# Patient Record
Sex: MALE | Race: ASIAN | NOT HISPANIC OR LATINO | Employment: UNEMPLOYED | URBAN - METROPOLITAN AREA
[De-identification: names, ages, dates, MRNs, and addresses within clinical notes are randomized per-mention and may not be internally consistent; named-entity substitution may affect disease eponyms.]

---

## 2021-01-05 ENCOUNTER — LAB VISIT (OUTPATIENT)
Dept: PRIMARY CARE CLINIC | Facility: OTHER | Age: 56
End: 2021-01-05
Attending: INTERNAL MEDICINE
Payer: OTHER GOVERNMENT

## 2021-01-05 DIAGNOSIS — Z03.818 ENCOUNTER FOR OBSERVATION FOR SUSPECTED EXPOSURE TO OTHER BIOLOGICAL AGENTS RULED OUT: ICD-10-CM

## 2021-01-05 PROCEDURE — U0003 INFECTIOUS AGENT DETECTION BY NUCLEIC ACID (DNA OR RNA); SEVERE ACUTE RESPIRATORY SYNDROME CORONAVIRUS 2 (SARS-COV-2) (CORONAVIRUS DISEASE [COVID-19]), AMPLIFIED PROBE TECHNIQUE, MAKING USE OF HIGH THROUGHPUT TECHNOLOGIES AS DESCRIBED BY CMS-2020-01-R: HCPCS

## 2021-01-06 LAB — SARS-COV-2 RNA RESP QL NAA+PROBE: NOT DETECTED

## 2023-10-21 LAB
CHOL/HDLC RATIO: 2.9
CHOLEST SERPL-MCNC: 155 MG/DL
CHOLEST SERPL-MCNC: 205 MG/DL
HBA1C MFR BLD HPLC: 6.9 %
HDLC SERPL-MCNC: 70 MG/DL
LDLC SERPL CALC-MCNC: 108 MG/DL
NON HDL CHOL (CALC): 135

## 2024-04-22 ENCOUNTER — TELEPHONE (OUTPATIENT)
Dept: PRIMARY CARE CLINIC | Facility: CLINIC | Age: 59
End: 2024-04-22
Payer: COMMERCIAL

## 2024-04-22 NOTE — TELEPHONE ENCOUNTER
----- Message from Melissa Ramsey sent at 4/22/2024 12:38 PM CDT -----  Contact: [phone] Pts spouse  Pts spouse is calling in regards to asking if Dr Hannah can accept the pt as a new pt because the pt speaks Yakut. Please call and advise. Thank you.

## 2024-05-07 ENCOUNTER — OFFICE VISIT (OUTPATIENT)
Dept: PRIMARY CARE CLINIC | Facility: CLINIC | Age: 59
End: 2024-05-07
Payer: COMMERCIAL

## 2024-05-07 VITALS
HEART RATE: 73 BPM | BODY MASS INDEX: 23.32 KG/M2 | RESPIRATION RATE: 18 BRPM | WEIGHT: 148.56 LBS | SYSTOLIC BLOOD PRESSURE: 148 MMHG | HEIGHT: 67 IN | DIASTOLIC BLOOD PRESSURE: 96 MMHG | OXYGEN SATURATION: 98 %

## 2024-05-07 DIAGNOSIS — Z12.11 ENCOUNTER FOR SCREENING COLONOSCOPY: ICD-10-CM

## 2024-05-07 DIAGNOSIS — Z72.0 TOBACCO USE: ICD-10-CM

## 2024-05-07 DIAGNOSIS — E78.5 HYPERLIPIDEMIA, UNSPECIFIED HYPERLIPIDEMIA TYPE: ICD-10-CM

## 2024-05-07 DIAGNOSIS — I10 PRIMARY HYPERTENSION: Primary | ICD-10-CM

## 2024-05-07 DIAGNOSIS — E11.9 TYPE 2 DIABETES MELLITUS WITHOUT COMPLICATION, WITHOUT LONG-TERM CURRENT USE OF INSULIN: ICD-10-CM

## 2024-05-07 PROCEDURE — 99999 PR PBB SHADOW E&M-EST. PATIENT-LVL V: CPT | Mod: PBBFAC,,, | Performed by: INTERNAL MEDICINE

## 2024-05-07 PROCEDURE — 3077F SYST BP >= 140 MM HG: CPT | Mod: CPTII,S$GLB,, | Performed by: INTERNAL MEDICINE

## 2024-05-07 PROCEDURE — 3080F DIAST BP >= 90 MM HG: CPT | Mod: CPTII,S$GLB,, | Performed by: INTERNAL MEDICINE

## 2024-05-07 PROCEDURE — 4010F ACE/ARB THERAPY RXD/TAKEN: CPT | Mod: CPTII,S$GLB,, | Performed by: INTERNAL MEDICINE

## 2024-05-07 PROCEDURE — 99214 OFFICE O/P EST MOD 30 MIN: CPT | Mod: S$GLB,,, | Performed by: INTERNAL MEDICINE

## 2024-05-07 PROCEDURE — 3008F BODY MASS INDEX DOCD: CPT | Mod: CPTII,S$GLB,, | Performed by: INTERNAL MEDICINE

## 2024-05-07 PROCEDURE — 1159F MED LIST DOCD IN RCRD: CPT | Mod: CPTII,S$GLB,, | Performed by: INTERNAL MEDICINE

## 2024-05-07 PROCEDURE — 1160F RVW MEDS BY RX/DR IN RCRD: CPT | Mod: CPTII,S$GLB,, | Performed by: INTERNAL MEDICINE

## 2024-05-07 RX ORDER — LISINOPRIL AND HYDROCHLOROTHIAZIDE 12.5; 2 MG/1; MG/1
1 TABLET ORAL 2 TIMES DAILY
COMMUNITY
Start: 2024-03-11 | End: 2024-05-07

## 2024-05-07 RX ORDER — AMLODIPINE AND BENAZEPRIL HYDROCHLORIDE 5; 40 MG/1; MG/1
1 CAPSULE ORAL DAILY
Qty: 90 CAPSULE | Refills: 3 | Status: SHIPPED | OUTPATIENT
Start: 2024-05-07 | End: 2025-05-07

## 2024-05-07 RX ORDER — ATORVASTATIN CALCIUM 10 MG/1
10 TABLET, FILM COATED ORAL NIGHTLY
Qty: 90 TABLET | Refills: 3 | Status: SHIPPED | OUTPATIENT
Start: 2024-05-07

## 2024-05-07 RX ORDER — METFORMIN HYDROCHLORIDE 500 MG/1
500 TABLET, EXTENDED RELEASE ORAL 2 TIMES DAILY
COMMUNITY
Start: 2024-03-11 | End: 2024-05-07 | Stop reason: SDUPTHER

## 2024-05-07 RX ORDER — ATORVASTATIN CALCIUM 10 MG/1
10 TABLET, FILM COATED ORAL NIGHTLY
COMMUNITY
End: 2024-05-07 | Stop reason: SDUPTHER

## 2024-05-07 RX ORDER — LISINOPRIL AND HYDROCHLOROTHIAZIDE 12.5; 2 MG/1; MG/1
1 TABLET ORAL 2 TIMES DAILY
Status: CANCELLED | OUTPATIENT
Start: 2024-05-07

## 2024-05-07 RX ORDER — METFORMIN HYDROCHLORIDE 500 MG/1
500 TABLET, EXTENDED RELEASE ORAL 2 TIMES DAILY
Qty: 180 TABLET | Refills: 3 | Status: SHIPPED | OUTPATIENT
Start: 2024-05-07

## 2024-05-09 NOTE — PROGRESS NOTES
Subjective:       Patient ID: Ck Chavez is a 59 y.o. male.    Chief Complaint: Establish Care    HPI  patient with history of mellitus hypertension hyperlipidemia tobacco use patient visit today to establish care he ran medication yesterday he deny any physical no short of breath chest pain dyspnea with exertion no headache no abdominal pain no nausea vomiting or diarrhea no nocturia no weight gain weight loss no change in bowel habit or urination patient still smoking and at the present time does not want to do any health maintenance or quit smoking .  Discussed with patient currently not much interested in do anything to reverse he cardiovascular risk  Review of Systems   Constitutional:  Positive for fatigue and unexpected weight change.   Respiratory:  Positive for cough. Negative for shortness of breath.    Cardiovascular:  Negative for chest pain and palpitations.   Gastrointestinal:  Negative for abdominal pain.   Musculoskeletal:  Negative for arthralgias and back pain.   Psychiatric/Behavioral:  Negative for dysphoric mood.        Objective:      Physical Exam  Vitals and nursing note reviewed.   Constitutional:       General: He is not in acute distress.     Appearance: He is well-developed.   HENT:      Head: Normocephalic and atraumatic.      Right Ear: External ear normal.      Left Ear: External ear normal.      Nose: Nose normal.   Eyes:      Extraocular Movements: Extraocular movements intact.      Conjunctiva/sclera: Conjunctivae normal.      Pupils: Pupils are equal, round, and reactive to light.   Neck:      Thyroid: No thyromegaly.   Cardiovascular:      Rate and Rhythm: Normal rate and regular rhythm.      Heart sounds: Normal heart sounds. No murmur heard.     No friction rub. No gallop.   Pulmonary:      Effort: Pulmonary effort is normal. No respiratory distress.      Breath sounds: Normal breath sounds. No wheezing or rales.   Chest:      Chest wall: No tenderness.   Abdominal:       General: Bowel sounds are normal. There is no distension.      Palpations: Abdomen is soft.      Tenderness: There is no abdominal tenderness.   Musculoskeletal:         General: No tenderness or deformity. Normal range of motion.      Cervical back: Normal range of motion and neck supple.   Lymphadenopathy:      Cervical: No cervical adenopathy.   Skin:     General: Skin is warm and dry.      Findings: No erythema or rash.   Neurological:      Mental Status: He is alert and oriented to person, place, and time.   Psychiatric:         Mood and Affect: Mood normal.         Thought Content: Thought content normal.         Judgment: Judgment normal.         Assessment:       1. Primary hypertension    2. Type 2 diabetes mellitus without complication, without long-term current use of insulin    3. Hyperlipidemia, unspecified hyperlipidemia type    4. Tobacco use    5. Encounter for screening colonoscopy        Plan:       Primary hypertension  Comments:  Well control with diet medication  Orders:  -     amLODIPine-benazepriL (LOTREL) 5-40 mg per capsule; Take 1 capsule by mouth once daily.  Dispense: 90 capsule; Refill: 3  -     CBC Auto Differential; Future; Expected date: 05/08/2024  -     Comprehensive Metabolic Panel; Future; Expected date: 05/08/2024  -     Urinalysis; Future; Expected date: 05/08/2024  -     HIV 1/2 Ag/Ab (4th Gen); Future; Expected date: 05/08/2024  -     Hepatitis C Antibody; Future; Expected date: 05/08/2024  -     Case Request Endoscopy: COLONOSCOPY    Type 2 diabetes mellitus without complication, without long-term current use of insulin  -     metFORMIN (GLUCOPHAGE-XR) 500 MG ER 24hr tablet; Take 1 tablet (500 mg total) by mouth 2 (two) times daily.  Dispense: 180 tablet; Refill: 3  -     Hemoglobin A1C; Future; Expected date: 05/08/2024  -     Microalbumin/Creatinine Ratio, Urine; Future; Expected date: 05/08/2024  -     PSA, Screening; Future; Expected date: 05/08/2024    Hyperlipidemia,  unspecified hyperlipidemia type  -     atorvastatin (LIPITOR) 10 MG tablet; Take 1 tablet (10 mg total) by mouth every evening.  Dispense: 90 tablet; Refill: 3  -     Lipid Panel; Future; Expected date: 05/08/2024    Tobacco use  Comments:  Patient not ready to stop smoking yet    Encounter for screening colonoscopy  -     Case Request Endoscopy: COLONOSCOPY        Medication List with Changes/Refills   New Medications    AMLODIPINE-BENAZEPRIL (LOTREL) 5-40 MG PER CAPSULE    Take 1 capsule by mouth once daily.   Changed and/or Refilled Medications    Modified Medication Previous Medication    ATORVASTATIN (LIPITOR) 10 MG TABLET atorvastatin (LIPITOR) 10 MG tablet       Take 1 tablet (10 mg total) by mouth every evening.    Take 10 mg by mouth every evening.    METFORMIN (GLUCOPHAGE-XR) 500 MG ER 24HR TABLET metFORMIN (GLUCOPHAGE-XR) 500 MG ER 24hr tablet       Take 1 tablet (500 mg total) by mouth 2 (two) times daily.    Take 500 mg by mouth 2 (two) times daily.   Discontinued Medications    LISINOPRIL 10 MG TABLET    Take 2 tablets (20 mg total) by mouth once daily.    LISINOPRIL-HYDROCHLOROTHIAZIDE (PRINZIDE,ZESTORETIC) 20-12.5 MG PER TABLET    Take 1 tablet by mouth 2 (two) times daily.    NAPROXEN (NAPROSYN) 500 MG TABLET    Take 1 tablet (500 mg total) by mouth 2 (two) times daily with meals.

## 2024-05-22 ENCOUNTER — CLINICAL SUPPORT (OUTPATIENT)
Dept: PRIMARY CARE CLINIC | Facility: CLINIC | Age: 59
End: 2024-05-22
Payer: COMMERCIAL

## 2024-05-22 VITALS — DIASTOLIC BLOOD PRESSURE: 84 MMHG | SYSTOLIC BLOOD PRESSURE: 122 MMHG

## 2024-05-22 DIAGNOSIS — I10 PRIMARY HYPERTENSION: Primary | ICD-10-CM

## 2024-05-22 PROCEDURE — 99999 PR PBB SHADOW E&M-EST. PATIENT-LVL II: CPT | Mod: PBBFAC,,,

## 2024-05-22 NOTE — LETTER
May 22, 2024      [facility] [address]  [place] [address]  Phone: [phone]  Fax: [phone]       Patient: Ck Chavez   YOB: 1965  Date of Visit: 05/22/2024    To Whom It May Concern:    Kevin Chavez  was at [facility] on 05/22/2024. The patient may return to work/school on 5/23/2024 with restrictions, no heavy lifting with right arm for 1 week. If you have any questions or concerns, or if I can be of further assistance, please do not hesitate to contact me.    Sincerely,        Tino Hannah MD

## 2024-05-22 NOTE — PROGRESS NOTES
2 week follow up BP check for Dr. Hannah, /84.  Patient is currently taking amlodipine-benazepril 5-40 mg per cap po once daily.  Patient denies N/V, headaches, dizziness, blurred vision.

## 2024-05-27 ENCOUNTER — TELEPHONE (OUTPATIENT)
Dept: GASTROENTEROLOGY | Facility: CLINIC | Age: 59
End: 2024-05-27
Payer: COMMERCIAL

## 2024-06-06 ENCOUNTER — OFFICE VISIT (OUTPATIENT)
Dept: PRIMARY CARE CLINIC | Facility: CLINIC | Age: 59
End: 2024-06-06
Payer: COMMERCIAL

## 2024-06-06 ENCOUNTER — PATIENT OUTREACH (OUTPATIENT)
Dept: ADMINISTRATIVE | Facility: HOSPITAL | Age: 59
End: 2024-06-06
Payer: COMMERCIAL

## 2024-06-06 VITALS
HEART RATE: 90 BPM | SYSTOLIC BLOOD PRESSURE: 138 MMHG | DIASTOLIC BLOOD PRESSURE: 66 MMHG | RESPIRATION RATE: 18 BRPM | HEIGHT: 67 IN | WEIGHT: 145.75 LBS | BODY MASS INDEX: 22.88 KG/M2 | OXYGEN SATURATION: 98 %

## 2024-06-06 DIAGNOSIS — S80.12XA CONTUSION OF LEFT LOWER EXTREMITY, INITIAL ENCOUNTER: Primary | ICD-10-CM

## 2024-06-06 DIAGNOSIS — S33.5XXA LUMBAR SPRAIN, INITIAL ENCOUNTER: ICD-10-CM

## 2024-06-06 DIAGNOSIS — I10 PRIMARY HYPERTENSION: ICD-10-CM

## 2024-06-06 DIAGNOSIS — S13.9XXA NECK SPRAIN, INITIAL ENCOUNTER: ICD-10-CM

## 2024-06-06 DIAGNOSIS — E11.9 TYPE 2 DIABETES MELLITUS WITHOUT COMPLICATION, WITHOUT LONG-TERM CURRENT USE OF INSULIN: ICD-10-CM

## 2024-06-06 DIAGNOSIS — M51.16 LUMBAR DISC DISEASE WITH RADICULOPATHY: ICD-10-CM

## 2024-06-06 PROCEDURE — 1160F RVW MEDS BY RX/DR IN RCRD: CPT | Mod: CPTII,S$GLB,, | Performed by: INTERNAL MEDICINE

## 2024-06-06 PROCEDURE — 4010F ACE/ARB THERAPY RXD/TAKEN: CPT | Mod: CPTII,S$GLB,, | Performed by: INTERNAL MEDICINE

## 2024-06-06 PROCEDURE — 3008F BODY MASS INDEX DOCD: CPT | Mod: CPTII,S$GLB,, | Performed by: INTERNAL MEDICINE

## 2024-06-06 PROCEDURE — 3075F SYST BP GE 130 - 139MM HG: CPT | Mod: CPTII,S$GLB,, | Performed by: INTERNAL MEDICINE

## 2024-06-06 PROCEDURE — 99213 OFFICE O/P EST LOW 20 MIN: CPT | Mod: S$GLB,,, | Performed by: INTERNAL MEDICINE

## 2024-06-06 PROCEDURE — 99999 PR PBB SHADOW E&M-EST. PATIENT-LVL IV: CPT | Mod: PBBFAC,,, | Performed by: INTERNAL MEDICINE

## 2024-06-06 PROCEDURE — 1159F MED LIST DOCD IN RCRD: CPT | Mod: CPTII,S$GLB,, | Performed by: INTERNAL MEDICINE

## 2024-06-06 PROCEDURE — 3078F DIAST BP <80 MM HG: CPT | Mod: CPTII,S$GLB,, | Performed by: INTERNAL MEDICINE

## 2024-06-06 NOTE — PROGRESS NOTES
Health Maintenance Due   Topic Date Due    Hepatitis C Screening  Never done    Lipid Panel  Never done    Colorectal Cancer Screening  Never done    RSV Vaccine (Age 60+ and Pregnant patients) (1 - 1-dose 60+ series) Never done        Chart review done.   HM updated.   Immunizations reviewed & updated.   Care Everywhere updated.

## 2024-06-06 NOTE — PROGRESS NOTES
Subjective:       Patient ID: Ck Chavez is a 59 y.o. male.    Chief Complaint: Back Pain, Neck Pain, and Leg Pain    HPI  Pt with h/o HTN tobacco use 1 ppd  pt visit today c/o leg hurt on Tuesday at work get ht by dumpster swollen bluish  bruise too painful had to go home use cold compress at home getting better swelling bruise resolving pt also c/o lower back pain and neck pain and can't extend rt big toe his lower back pain has been chronic but neck pain is new no radiculopathy or weakness in arms or leg  Review of Systems   Constitutional:  Negative for unexpected weight change.   Respiratory:  Negative for shortness of breath.    Gastrointestinal:  Negative for abdominal pain.   Musculoskeletal:  Positive for arthralgias, back pain, neck pain and neck stiffness.   Psychiatric/Behavioral:  The patient is nervous/anxious.       Objective:      Physical Exam  Vitals and nursing note reviewed.   Constitutional:       General: He is not in acute distress.     Appearance: He is well-developed.   HENT:      Head: Normocephalic and atraumatic.      Right Ear: External ear normal.      Left Ear: External ear normal.      Nose: Nose normal.      Mouth/Throat:      Pharynx: No oropharyngeal exudate.   Eyes:      Conjunctiva/sclera: Conjunctivae normal.      Pupils: Pupils are equal, round, and reactive to light.   Neck:      Thyroid: No thyromegaly.   Cardiovascular:      Rate and Rhythm: Normal rate and regular rhythm.      Heart sounds: Normal heart sounds. No murmur heard.     No friction rub. No gallop.   Pulmonary:      Effort: Pulmonary effort is normal. No respiratory distress.      Breath sounds: Normal breath sounds. No wheezing.   Abdominal:      General: Bowel sounds are normal. There is no distension.      Palpations: Abdomen is soft.      Tenderness: There is no abdominal tenderness.   Musculoskeletal:         General: Tenderness present. No deformity. Normal range of motion.      Cervical back: Normal range  of motion and neck supple.   Lymphadenopathy:      Cervical: No cervical adenopathy.   Skin:     General: Skin is warm and dry.      Findings: No erythema or rash.      Comments: Moderate size bruise left lateral leg tender with palpation no swelling no redness   Neurological:      Mental Status: He is alert and oriented to person, place, and time.   Psychiatric:         Thought Content: Thought content normal.         Judgment: Judgment normal.         Assessment:       1. Contusion of left lower extremity, initial encounter    2. Primary hypertension    3. Type 2 diabetes mellitus without complication, without long-term current use of insulin    4. Neck sprain, initial encounter    5. Lumbar sprain, initial encounter    6. Lumbar disc disease with radiculopathy        Plan:       Contusion of left lower extremity, initial encounter  -     X-Ray Knee 3 View Left; Future; Expected date: 06/06/2024    Primary hypertension  Comments:  fairly controlled continue with tx and monitor    Type 2 diabetes mellitus without complication, without long-term current use of insulin  Comments:  labs still pending from [facility] month remind pt to do asap    Neck sprain, initial encounter  -     X-Ray Cervical Spine AP And Lateral; Future; Expected date: 06/06/2024    Lumbar sprain, initial encounter  -     X-Ray Lumbar Spine Ap And Lateral; Future; Expected date: 06/06/2024    Lumbar disc disease with radiculopathy  -     X-Ray Knee 3 View Left; Future; Expected date: 06/06/2024        Medication List with Changes/Refills   Current Medications    AMLODIPINE-BENAZEPRIL (LOTREL) 5-40 MG PER CAPSULE    Take 1 capsule by mouth once daily.    ATORVASTATIN (LIPITOR) 10 MG TABLET    Take 1 tablet (10 mg total) by mouth every evening.    METFORMIN (GLUCOPHAGE-XR) 500 MG ER 24HR TABLET    Take 1 tablet (500 mg total) by mouth 2 (two) times daily.

## 2024-06-06 NOTE — LETTER
June 6, 2024      [facility] [address]  [address]  [place] [address]  Phone: [phone]  Fax: [phone]       Patient: Ck Chavez   YOB: 1965  Date of Visit: 06/06/2024    To Whom It May Concern:    Kevin Chavez  was at [facility] on 06/06/2024. The patient may return to work on 06/12/2024 with no restrictions. If you have any questions or concerns, or if I can be of further assistance, please do not hesitate to contact me.    Sincerely,    Tino Hannah MD

## 2024-06-07 ENCOUNTER — TELEPHONE (OUTPATIENT)
Dept: GASTROENTEROLOGY | Facility: CLINIC | Age: 59
End: 2024-06-07
Payer: COMMERCIAL

## 2024-06-10 ENCOUNTER — TELEPHONE (OUTPATIENT)
Dept: SURGERY | Facility: CLINIC | Age: 59
End: 2024-06-10
Payer: COMMERCIAL

## 2024-06-10 NOTE — TELEPHONE ENCOUNTER
The patient has been advised the Colonoscopy Prep Kit will be ordered from the patient's verified preferred pharmacy on file. The medication can  be picked up by the patient, or the patient's designated representative.The patient was further explained the Colonoscopy Prep instructions will be mailed to the patient verified mailing address on file, or put onto the [employer] portal, whichever method of delivery the patient prefers.Additionally this patient was informed,not to follow the instructions that comes with the bowel prep medication. However, the patient was instructed to please follow the Colonoscopy Bowel Prep instructions that's being provided by the . The patient was asked to please to follow the Colonoscopy Prep instructions being provided as precisely,and  meticulously as possible.The patient was advised you  will receive a follow up phone call to summarize the Colonoscopy Prep instructions prior to the scheduled Colonoscopy procedure date. At this time the patient will be given an opportunity to ask any questions regarding the Colonoscopy procedure, and it's associated Bowel Prep instructions.

## 2024-06-10 NOTE — TELEPHONE ENCOUNTER
Called patient in reference to a referral to Colorectal Surgery for colon cancer screening. Patient verbally consented to a Colonoscopy and requested to be scheduled for a Colonoscopy on 07/10/2024 Patient was advised a designated  is required on the day of the Colonoscopy to drive the patient home and the  must be at least. 18 years old.Colonoscopy Prep instructions were thoroughly explained and discussed with the patient.It was emphasized, and reiterated to the patient, to please not to follow the bowel prep instructions that comes with the bowel prep package.However, to please follow the prep instructions that will be received in the mail,or via the [employer] portal, or by both modes of delivery, which ever method of delivery the patient prefers,from the [facility]   Patient acknowledges understanding Prep instructions as explained and discussed on the phone.. Patient was advised the Colonoscopy Prep instructions discussed and explained on the phone,are being mailed out to the patient's verified address on file,or put onto the [employer] portal,or both methods of delivery, whichever the patient prefers. Patient's address on file was verified with the patient for accuracy of mailing. Patient's medications on file was reviewed with the patient for accuracy of information. Patient denies taking  any other medications other than those listed and verified on medication profile.Patient was explained the Colonoscopy will be performed here at [facility]. Patient was further explained the Pre-Op will call one day prior to the procedure date, to discuss Pre-Op instructions;and what time to report for the Colonoscopy. The patient was given the opportunity to ask any questions about the Colonoscopy. No further issues were discussed.

## 2024-06-11 RX ORDER — SODIUM, POTASSIUM,MAG SULFATES 17.5-3.13G
1 SOLUTION, RECONSTITUTED, ORAL ORAL DAILY
Qty: 1 KIT | Refills: 0 | Status: SHIPPED | OUTPATIENT
Start: 2024-06-11 | End: 2024-06-13

## 2024-07-08 ENCOUNTER — PATIENT OUTREACH (OUTPATIENT)
Dept: ADMINISTRATIVE | Facility: HOSPITAL | Age: 59
End: 2024-07-08
Payer: COMMERCIAL

## 2024-07-08 NOTE — PROGRESS NOTES
Health Maintenance Due   Topic Date Due    Hepatitis C Screening  Never done    Hemoglobin A1c  Never done    Diabetes Urine Screening  Never done    Foot Exam  Never done    RSV Vaccine (Age 60+ and Pregnant patients) (1 - 1-dose 60+ series) Never done        Chart review done.   HM updated.   Immunizations reviewed & updated.   Care Everywhere updated.

## 2024-07-09 ENCOUNTER — OFFICE VISIT (OUTPATIENT)
Dept: PRIMARY CARE CLINIC | Facility: CLINIC | Age: 59
End: 2024-07-09
Payer: COMMERCIAL

## 2024-07-09 VITALS
WEIGHT: 143.88 LBS | BODY MASS INDEX: 22.58 KG/M2 | RESPIRATION RATE: 18 BRPM | HEART RATE: 83 BPM | DIASTOLIC BLOOD PRESSURE: 82 MMHG | HEIGHT: 67 IN | SYSTOLIC BLOOD PRESSURE: 136 MMHG | OXYGEN SATURATION: 97 %

## 2024-07-09 DIAGNOSIS — M51.16 LUMBAR DISC DISEASE WITH RADICULOPATHY: Primary | ICD-10-CM

## 2024-07-09 DIAGNOSIS — S80.12XA CONTUSION OF LEFT LOWER EXTREMITY, INITIAL ENCOUNTER: ICD-10-CM

## 2024-07-09 DIAGNOSIS — E78.5 HYPERLIPIDEMIA, UNSPECIFIED HYPERLIPIDEMIA TYPE: ICD-10-CM

## 2024-07-09 DIAGNOSIS — Z72.0 TOBACCO USE: ICD-10-CM

## 2024-07-09 DIAGNOSIS — I10 PRIMARY HYPERTENSION: ICD-10-CM

## 2024-07-09 DIAGNOSIS — E11.9 TYPE 2 DIABETES MELLITUS WITHOUT COMPLICATION, WITHOUT LONG-TERM CURRENT USE OF INSULIN: ICD-10-CM

## 2024-07-09 PROCEDURE — 3008F BODY MASS INDEX DOCD: CPT | Mod: CPTII,S$GLB,, | Performed by: INTERNAL MEDICINE

## 2024-07-09 PROCEDURE — 3075F SYST BP GE 130 - 139MM HG: CPT | Mod: CPTII,S$GLB,, | Performed by: INTERNAL MEDICINE

## 2024-07-09 PROCEDURE — 99999 PR PBB SHADOW E&M-EST. PATIENT-LVL III: CPT | Mod: PBBFAC,,, | Performed by: INTERNAL MEDICINE

## 2024-07-09 PROCEDURE — 99214 OFFICE O/P EST MOD 30 MIN: CPT | Mod: S$GLB,,, | Performed by: INTERNAL MEDICINE

## 2024-07-09 PROCEDURE — 3079F DIAST BP 80-89 MM HG: CPT | Mod: CPTII,S$GLB,, | Performed by: INTERNAL MEDICINE

## 2024-07-09 PROCEDURE — 1160F RVW MEDS BY RX/DR IN RCRD: CPT | Mod: CPTII,S$GLB,, | Performed by: INTERNAL MEDICINE

## 2024-07-09 PROCEDURE — 4010F ACE/ARB THERAPY RXD/TAKEN: CPT | Mod: CPTII,S$GLB,, | Performed by: INTERNAL MEDICINE

## 2024-07-09 PROCEDURE — 1159F MED LIST DOCD IN RCRD: CPT | Mod: CPTII,S$GLB,, | Performed by: INTERNAL MEDICINE

## 2024-07-09 NOTE — LETTER
July 9, 2024      [facility] [address]  [address]  [place] [address]  Phone: [phone]  Fax: [phone]       Patient: Ck Chavez   YOB: 1965  Date of Visit: 07/09/2024    To Whom It May Concern:    Kevin Chavez  was at [facility] on 07/09/2024. He may return to work on 07/15/2024 with restrictions. No climbing. No standing for long periods of time. No lifting more than 15lbs. If you have any questions or concerns, or if I can be of further assistance, please do not hesitate to contact me.    Sincerely,    Gretchen Campuzano MA

## 2024-07-10 ENCOUNTER — PATIENT MESSAGE (OUTPATIENT)
Dept: ADMINISTRATIVE | Facility: HOSPITAL | Age: 59
End: 2024-07-10
Payer: COMMERCIAL

## 2024-07-10 ENCOUNTER — TELEPHONE (OUTPATIENT)
Dept: PRIMARY CARE CLINIC | Facility: CLINIC | Age: 59
End: 2024-07-10
Payer: COMMERCIAL

## 2024-07-10 NOTE — PROGRESS NOTES
Subjective:       Patient ID: Ck Chavez is a 59 y.o. male.    Chief Complaint: Follow-up (4 week)    Follow-up  Associated symptoms include arthralgias. Pertinent negatives include no abdominal pain or chest pain.     Patient visit today for follow-up he is ready to go back to work tomorrow he had chronic back pain with right sciatica and muscle weakness from old injury and it is getting worse pain is from the right big toe extend into the right leg right lateral right thigh and right lower back he had MRI done sick years ago that show severe foramen stenosis from lumbar spine disc herniation on the right 3 disc patient has been treated at [facility] and physical therapy he had recent MRI of lumbar spine again in all in niece but results not available patient will have hospital to send a copy of the MRI his still smoking a pack a day and not ready to quit yet refused any assistance patient had not seen by Neurosurgery yet he has not had any epidural injection recently either the lower back pain and the radiculopathy pain interfere with 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   Respiratory:  Negative for shortness of breath.    Cardiovascular:  Negative for chest pain.   Gastrointestinal:  Negative for abdominal pain.   Musculoskeletal:  Positive for arthralgias and back pain.   Psychiatric/Behavioral:  Negative for dysphoric mood.        Objective:      Physical Exam  Vitals and nursing note reviewed.   Constitutional:       General: He is not in acute distress.     Appearance: He is  well-developed.   HENT:      Head: Normocephalic and atraumatic.      Right Ear: External ear normal.      Left Ear: External ear normal.      Nose: Nose normal.   Eyes:      Extraocular Movements: Extraocular movements intact.      Conjunctiva/sclera: Conjunctivae normal.      Pupils: Pupils are equal, round, and reactive to light.   Neck:      Thyroid: No thyromegaly.   Cardiovascular:      Rate and Rhythm: Normal rate and regular rhythm.      Heart sounds: Normal heart sounds. No murmur heard.     No friction rub. No gallop.   Pulmonary:      Effort: Pulmonary effort is normal. No respiratory distress.      Breath sounds: Normal breath sounds. No wheezing.   Abdominal:      General: Bowel sounds are normal. There is no distension.      Palpations: Abdomen is soft.      Tenderness: There is no abdominal tenderness.   Musculoskeletal:         General: Tenderness (Tenderness across lower back mostly radiated into the right buttock the right lateral thigh lateral right leg in the dorsum of bright foot unable to extend right big toe) present. No deformity. Normal range of motion.      Cervical back: Normal range of motion and neck supple.      Comments: Left ankle  and swelling below the lateral malleolus from recent injury   Lymphadenopathy:      Cervical: No cervical adenopathy.   Skin:     General: Skin is warm and dry.      Findings: No erythema or rash.   Neurological:      Mental Status: He is alert and oriented to person, place, and time.   Psychiatric:         Thought Content: Thought content normal.         Judgment: Judgment normal.         Assessment:       1. Lumbar disc disease with radiculopathy    2. Type 2 diabetes mellitus without complication, without long-term current use of insulin    3. Primary hypertension    4. Hyperlipidemia, unspecified hyperlipidemia type    5. Contusion of left lower extremity, initial encounter    6. Tobacco use        Plan:       Lumbar disc disease with  radiculopathy  Comments:  Severe radiculopathy will treat with medication and consult Neurosurgery    Type 2 diabetes mellitus without complication, without long-term current use of insulin  Comments:  Patient had blood test done with other clinic forget to get copies but states everything was done will wait for results before adding more tests  Orders:  -     Foot Exam Performed; Future; Expected date: 07/09/2024    Primary hypertension  Comments:  Blood pressure well control continue with treatment    Hyperlipidemia, unspecified hyperlipidemia type  Comments:  Continue with Lipitor and repeat blood tests in three-month    Contusion of left lower extremity, initial encounter  Comments:  Left ankle sprain continue to treat and observe    Tobacco use  Comments:  Discussed with patient about smoking cessation patient is not ready to reduce or quit smoking yet        Medication List with Changes/Refills   Current Medications    AMLODIPINE-BENAZEPRIL (LOTREL) 5-40 MG PER CAPSULE    Take 1 capsule by mouth once daily.    ATORVASTATIN (LIPITOR) 10 MG TABLET    Take 1 tablet (10 mg total) by mouth every evening.    METFORMIN (GLUCOPHAGE-XR) 500 MG ER 24HR TABLET    Take 1 tablet (500 mg total) by mouth 2 (two) times daily.

## 2024-07-10 NOTE — TELEPHONE ENCOUNTER
----- Message from Nickie Crowell sent at 7/10/2024 11:32 AM CDT -----  Contact: pt's wife Maritza [phone]  Per Maritza, pt's former Dr's office would like to know if pt signed a medical release. If so Dr Hannah's office can receive the pt's medical records. Per Maritza,if not she would have to pay a fee for them.     Maritza would like to know if he signed a medical release or not. Please advise.                 Thank you

## 2024-07-10 NOTE — TELEPHONE ENCOUNTER
Called and inform patient and his wife  that we do not see a medical release form and they can come to sign one and bring the fax number with them. They said that they will come tomorrow to complete that. They both verbalized understanding.

## 2024-07-12 ENCOUNTER — CLINICAL SUPPORT (OUTPATIENT)
Dept: PRIMARY CARE CLINIC | Facility: CLINIC | Age: 59
End: 2024-07-12
Payer: COMMERCIAL

## 2024-07-12 ENCOUNTER — TELEPHONE (OUTPATIENT)
Dept: PRIMARY CARE CLINIC | Facility: CLINIC | Age: 59
End: 2024-07-12

## 2024-07-12 DIAGNOSIS — M51.16 LUMBAR DISC DISEASE WITH RADICULOPATHY: Primary | ICD-10-CM

## 2024-07-12 PROCEDURE — 99999 PR PBB SHADOW E&M-EST. PATIENT-LVL I: CPT | Mod: PBBFAC,,,

## 2024-07-12 NOTE — TELEPHONE ENCOUNTER
Patient Authorization for Release of Confindential Information was faxed over to Dr. Hema Chavez office on [employer] at [phone], and on [facility] at [phone] today 7/12/2024 at 2:14 pm. I also faxed over a a form to [facility] at [phone] today 7/12/2024 at 2:16 pm.

## 2024-07-12 NOTE — TELEPHONE ENCOUNTER
Patient came in to sign a medical release paper to send to another doctor to send records to us and he was looking for a referral that he discusses with you for his sciatica pain. He said you told them your would send him to a specialist at [facility] but there is no referral in the chart.

## 2024-07-12 NOTE — PROGRESS NOTES
Patient came in on nurse visit to sign a Authorization for Release of Confidential Information to send to the doctors office fax numbers she gave me.

## 2024-07-16 ENCOUNTER — TELEPHONE (OUTPATIENT)
Dept: NEUROSURGERY | Facility: CLINIC | Age: 59
End: 2024-07-16
Payer: COMMERCIAL

## 2024-07-16 NOTE — TELEPHONE ENCOUNTER
Carmelo Hoff Staff  Good Morning. Patient has an urgent referral for dx: Lumbar disc disease with radiculopathy [M51.16]. No available appointments in [facility]. Please assist with scheduling. Thanks.    Called and confirmed appointment with Dr. Sims on 08/28/2024 at 11:30 AM. Will mail appointment reminder home. Patient stated that he will call back to reschedule if appointment date/time does not work.

## 2024-08-19 ENCOUNTER — TELEPHONE (OUTPATIENT)
Dept: NEUROSURGERY | Facility: CLINIC | Age: 59
End: 2024-08-19
Payer: COMMERCIAL

## 2024-08-19 NOTE — TELEPHONE ENCOUNTER
Attempted to return patient's call. No response. Left voicemail advising call back.    ----- Message from Dena Schroeder sent at 8/19/2024 12:09 PM CDT -----  Regarding: Reschedule appointment  Contact: Maritza/wife [phone]  Patient's wife Maritza calling to reschedule appointment due to losing insurance and in the process of getting different insurance. Please contact patient as soon as possible.

## 2024-08-28 ENCOUNTER — TELEPHONE (OUTPATIENT)
Dept: NEUROSURGERY | Facility: CLINIC | Age: 59
End: 2024-08-28
Payer: COMMERCIAL

## 2024-08-28 DIAGNOSIS — M54.9 DORSALGIA, UNSPECIFIED: Primary | ICD-10-CM

## 2024-08-28 NOTE — TELEPHONE ENCOUNTER
Attempted to call and reschedule patient's appointment along with scheduling MRI. No answer. Left voicemail informing new date and time. Will mail appointment notice to address on file.

## 2024-09-03 ENCOUNTER — HOSPITAL ENCOUNTER (OUTPATIENT)
Dept: RADIOLOGY | Facility: HOSPITAL | Age: 59
Discharge: HOME | End: 2024-09-03
Attending: NEUROLOGICAL SURGERY
Payer: COMMERCIAL

## 2024-09-03 DIAGNOSIS — M54.9 DORSALGIA, UNSPECIFIED: ICD-10-CM

## 2024-09-03 PROCEDURE — 72148 MRI LUMBAR SPINE W/O DYE: CPT | Mod: TC

## 2024-09-03 PROCEDURE — 72148 MRI LUMBAR SPINE W/O DYE: CPT | Mod: 26,,, | Performed by: RADIOLOGY

## 2024-09-09 ENCOUNTER — TELEPHONE (OUTPATIENT)
Dept: NEUROSURGERY | Facility: CLINIC | Age: 59
End: 2024-09-09
Payer: COMMERCIAL

## 2024-09-09 NOTE — TELEPHONE ENCOUNTER
Called in attempt to get patient scheduled to see Dr. Sims. No answer. Left VM advising patient to call back.

## 2024-09-09 NOTE — TELEPHONE ENCOUNTER
Called and confirmed with patient's friend, Maritza, appointment for patient on:    Future Appointments   Date Time Provider Department Center   10/10/2024  4:00 PM Tino Hannah MD [facility]       ----- Message from Fanny Roach sent at 9/9/2024 11:07 AM CDT -----  Regarding: appt access  Contact: pt [phone]  Type:  Patient Returning Call    Who Called:Maritza/friend   Who Left Message for Patient:Coleen   Does the patient know what this is regarding?:Yes, an appt   Would the patient rather a call back or a response via [facility]? Callback   Best Call Back Number:[phone]

## 2024-09-10 ENCOUNTER — TELEPHONE (OUTPATIENT)
Dept: NEUROSURGERY | Facility: CLINIC | Age: 59
End: 2024-09-10
Payer: COMMERCIAL

## 2024-09-10 NOTE — TELEPHONE ENCOUNTER
Called and confirmed rescheduling patient's appointment with Dr. Sims tomorrow due to weather to:    Future Appointments   Date Time Provider Department Center   9/12/2024  9:15 AM Irvin Sims MD [facility]   10/10/2024  4:00 PM Tino Hannah MD [facility]

## 2024-09-11 ENCOUNTER — PATIENT MESSAGE (OUTPATIENT)
Dept: NEUROSURGERY | Facility: CLINIC | Age: 59
End: 2024-09-11
Payer: COMMERCIAL

## 2024-09-12 ENCOUNTER — OFFICE VISIT (OUTPATIENT)
Dept: NEUROSURGERY | Facility: CLINIC | Age: 59
End: 2024-09-12
Payer: COMMERCIAL

## 2024-09-12 DIAGNOSIS — M51.16 LUMBAR DISC DISEASE WITH RADICULOPATHY: ICD-10-CM

## 2024-09-12 PROCEDURE — 99204 OFFICE O/P NEW MOD 45 MIN: CPT | Mod: S$GLB,,, | Performed by: NEUROLOGICAL SURGERY

## 2024-09-12 PROCEDURE — 1159F MED LIST DOCD IN RCRD: CPT | Mod: CPTII,S$GLB,, | Performed by: NEUROLOGICAL SURGERY

## 2024-09-12 PROCEDURE — 4010F ACE/ARB THERAPY RXD/TAKEN: CPT | Mod: CPTII,S$GLB,, | Performed by: NEUROLOGICAL SURGERY

## 2024-09-12 PROCEDURE — 99999 PR PBB SHADOW E&M-EST. PATIENT-LVL II: CPT | Mod: PBBFAC,,, | Performed by: NEUROLOGICAL SURGERY

## 2024-10-03 NOTE — PROGRESS NOTES
Neurosurgery  History & Physical    SUBJECTIVE:     Chief Complaint:  Patient was referred to us for evaluation of lumbar disc disease.    History of Present Illness:  This is a 59-year-old [place] male with long history of intermittent back issues.  Patient has long history of lower back pain with right-sided radiculopathy.  Things have gotten worse over the last year so.  Patient denies any bowel bladder issues.  Patient was any focal deficits.  Patient was not had physical therapy or injections.  Patient was tried various anti-inflammatories muscle relaxants in the past.  Patient was been treated tumor has.  Patient was a smoker.    Review of patient's allergies indicates:  No Known Allergies    Current Outpatient Medications   Medication Sig Dispense Refill    amLODIPine-benazepriL (LOTREL) 5-40 mg per capsule Take 1 capsule by mouth once daily. 90 capsule 3    atorvastatin (LIPITOR) 10 MG tablet Take 1 tablet (10 mg total) by mouth every evening. 90 tablet 3    metFORMIN (GLUCOPHAGE-XR) 500 MG ER 24hr tablet Take 1 tablet (500 mg total) by mouth 2 (two) times daily. 180 tablet 3     No current facility-administered medications for this visit.       Past Medical History:   Diagnosis Date    Diabetes mellitus     Hypertension      Past Surgical History:   Procedure Laterality Date    COLONOSCOPY N/A 7/10/2024    Procedure: COLONOSCOPY;  Surgeon: Blue Tristan MD;  Location: [facility];  Service: Endoscopy;  Laterality: N/A;     Family History    None       Social History     Socioeconomic History    Marital status: Single   Tobacco Use    Smoking status: Every Day     Current packs/day: 1.00     Types: Cigarettes     Passive exposure: Current    Smokeless tobacco: Never   Substance and Sexual Activity    Alcohol use: Yes     Alcohol/week: 3.0 standard drinks of alcohol     Types: 3 Cans of beer per week     Comment: 2-3 Weekly    Drug use: Not Currently       Review of Systems    OBJECTIVE:     Vital  Signs  Pain Score:   7  There is no height or weight on file to calculate BMI.      Neurosurgery Physical Exam      Diagnostic Results:  EXAMINATION:  MRI LUMBAR SPINE WITHOUT CONTRAST     CLINICAL HISTORY:  Low back pain, symptoms persist with > 6wks conservative treatment; Dorsalgia, unspecified     TECHNIQUE:  Multiplanar, multisequence MR images were acquired from the thoracolumbar junction to the sacrum without contrast.     COMPARISON:  Radiographs 06/07/2024     FINDINGS:  Alignment: Straightening of lordosis.     Vertebrae: No fracture or marrow infiltrative process.     Discs: Disc heights are maintained.  Multilevel disc desiccation noted.  Annular fissures seen at L3-L4, L4-L5 and L5-S1.  No evidence for discitis.     Cord: Conus terminates at L1 and appears unremarkable.  Cauda equina appears unremarkable.     Degenerative findings:     T12-L1: No spinal canal stenosis or neuroforaminal narrowing.     L1-L2: No spinal canal stenosis or neural foraminal narrowing.     L2-L3: Circumferential disc bulge and moderate facet arthropathy.  No spinal canal stenosis or neural foraminal narrowing.     L3-L4: Circumferential disc bulge with small central protrusion and mild facet arthropathy.  No spinal canal stenosis or neural foraminal narrowing.     L4-L5: Circumferential disc bulge with central protrusion and mild facet arthropathy result in moderate effacement of the bilateral lateral recesses and moderate left, mild right neural foraminal narrowing.     L5-S1: Circumferential disc bulge with central protrusion and mild facet arthropathy result in mild effacement of the bilateral lateral recesses and mild right, moderate left neural foraminal narrowing.     Paraspinal muscles & soft tissues: T2 hyperintense lesion measuring 1.4 cm noted in the right adrenal gland, indeterminate.     Impression:     1. Multilevel degenerative changes of the lumbar spine detailed above.  Moderate neural foraminal narrowing noted  at L4-S1.  Moderate effacement of the lateral recesses noted at L4-L5.  2. 1.4 cm T2 hyperintense right adrenal nodule.  Recommend further evaluation with dedicated adrenal protocol MRI or CT.    ASSESSMENT/PLAN:     Patient has a history of back pain rates radiculopathy.  Patient was multiple level degenerative disc disease but nothing appears clearly surgical at this stage.  I think patient would benefit from an epidural injection at L4-5 L5-S1 physical therapy and are healthy back program.  We would also like to get an EMG nerve conduction test to better delineate which level his radiculopathy is coming from.     Patient was has a an adrenal nodule that has noted on the MRI scan which I advised him to follow up with his primary care physician Dr. Hannah for further work-up        Note dictated with voice recognition software, please excuse any grammatical errors.

## 2024-10-08 ENCOUNTER — CLINICAL SUPPORT (OUTPATIENT)
Dept: REHABILITATION | Facility: HOSPITAL | Age: 59
End: 2024-10-08
Attending: NEUROLOGICAL SURGERY
Payer: COMMERCIAL

## 2024-10-08 DIAGNOSIS — M51.16 LUMBAR DISC DISEASE WITH RADICULOPATHY: ICD-10-CM

## 2024-10-08 DIAGNOSIS — R29.898 DECREASED STRENGTH OF TRUNK AND BACK: Primary | ICD-10-CM

## 2024-10-08 PROCEDURE — 97750 PHYSICAL PERFORMANCE TEST: CPT | Mod: 32

## 2024-10-08 PROCEDURE — 97530 THERAPEUTIC ACTIVITIES: CPT

## 2024-10-08 NOTE — PLAN OF CARE
[facility]  Physical Therapy Lumbar Evaluation      Name: Ck Chavez  Clinic Number: [id number]    Therapy Diagnosis:   Encounter Diagnosis   Name Primary?    Lumbar disc disease with radiculopathy      Physician: Irvin Sims MD    Physician Orders: PT Eval and Treat  Medical Diagnosis from Referral: M51.16 (ICD-10-CM) - Lumbar disc disease with radiculopathy   Evaluation Date: 10/8/2024  Authorization Period Expiration: 12/30/2024  Plan of Care Expiration: 12/17/2024  Reassessment Due: 11/8/2024  Visit # / Visits authorized: 1 /1  MedX testing visit 2    Time In: 9:45 AM  Time Out: 11:00 AM  Total Billable Time: 75 minutes  INSURANCE and OUTCOMES: Fee for Service with FOTO Outcomes 1/3    Precautions: standard, HTN, and diabetes    Pattern of pain determined: pattern 3    Subjective     Date of onset: ten years ago  History of current condition: Ck reports ten year history of right leg pain which started after injuring his back while lifting at work.  He reports right leg numbness is constant, his right big toe won't lift up.  He reports occasional burning pain, occasional low back pain.      Medical History:   Past Medical History:   Diagnosis Date    Diabetes mellitus     Hypertension        Surgical History:   Ck Chavez  has a past surgical history that includes Colonoscopy (N/A, 7/10/2024).    Medications:   Ck has a current medication list which includes the following prescription(s): amlodipine-benazepril, atorvastatin, and metformin.    Allergies:   Review of patient's allergies indicates:  No Known Allergies     Imaging: MRI   MRI LUMBAR SPINE WITHOUT CONTRAST     CLINICAL HISTORY:  Low back pain, symptoms persist with > 6wks conservative treatment; Dorsalgia, unspecified     TECHNIQUE:  Multiplanar, multisequence MR images were acquired from the thoracolumbar junction to the sacrum without contrast.     COMPARISON:  Radiographs 06/07/2024      FINDINGS:  Alignment: Straightening of lordosis.     Vertebrae: No fracture or marrow infiltrative process.     Discs: Disc heights are maintained.  Multilevel disc desiccation noted.  Annular fissures seen at L3-L4, L4-L5 and L5-S1.  No evidence for discitis.     Cord: Conus terminates at L1 and appears unremarkable.  Cauda equina appears unremarkable.     Degenerative findings:     T12-L1: No spinal canal stenosis or neuroforaminal narrowing.     L1-L2: No spinal canal stenosis or neural foraminal narrowing.     L2-L3: Circumferential disc bulge and moderate facet arthropathy.  No spinal canal stenosis or neural foraminal narrowing.     L3-L4: Circumferential disc bulge with small central protrusion and mild facet arthropathy.  No spinal canal stenosis or neural foraminal narrowing.     L4-L5: Circumferential disc bulge with central protrusion and mild facet arthropathy result in moderate effacement of the bilateral lateral recesses and moderate left, mild right neural foraminal narrowing.     L5-S1: Circumferential disc bulge with central protrusion and mild facet arthropathy result in mild effacement of the bilateral lateral recesses and mild right, moderate left neural foraminal narrowing.     Paraspinal muscles & soft tissues: T2 hyperintense lesion measuring 1.4 cm noted in the right adrenal gland, indeterminate.     Impression:     1. Multilevel degenerative changes of the lumbar spine detailed above.  Moderate neural foraminal narrowing noted at L4-S1.  Moderate effacement of the lateral recesses noted at L4-L5.  2. 1.4 cm T2 hyperintense right adrenal nodule.  Recommend further evaluation with dedicated adrenal protocol MRI or CT.    X-ray:  XR LUMBAR SPINE AP AND LATERAL     CLINICAL HISTORY:  Sprain of ligaments of lumbar spine, initial encounter     TECHNIQUE:  AP, lateral and spot images were performed of the lumbar spine.     COMPARISON:  None     FINDINGS:  Alignment: Minimal levoconvex curvature  "of the spine     Vertebrae: Vertebral body heights are maintained.  No suspicious appearing lytic or blastic lesions.     Discs and facets: Disc heights are maintained.  Mild facet joint arthropathy with lower lumbar predominance.     Sacroiliac joints are symmetric.     Miscellaneous: No additional findings.     Prior Therapy: yes  Prior Treatment: physical therapy   Social History:  lives alone  Occupation: not currently working, laid off about 3 months ago, used to work maintenance in school  Leisure:       Prior Level of Function: independent  Current Level of Function: independent  DME owned/used: [employer] bike  Gym Membership: no    Pain:  Current 4/10, worst 9/10, best 4/10   Location: right leg  Description: Burning, Tingling, and Numb  Aggravating Factors: Sitting, Standing, and Walking  Easing Factors: nothing  Disturbed Sleep: yes    Pattern of pain questions:  1.  Where is your pain the worst? Right leg  2.  Is your pain constant or intermittent? intermittent  3.  Does bending forward make your typical pain worse? no  4.  Since the start of your back pain, has there been a change in your bowel or bladder? no  5.  What can't you do now that you use to be able to do? Carry heavy things    Pts goals: "    Red Flag Screening:   Cough/Sneeze Strain: (--)  Bladder/Bowel: (--)  Falls: (--)  Night pain: (+)  Unexplained weight loss: (--)  General health: fair    Objective      Postural examination/scapula alignment: Rounded shoulder, Head forward, and Decreased lordosis  Joint integrity: Firm end feeling  Skin integrity:WNL   Edema: None  Correction of posture: better with lumbar roll  Sitting: poor  Standing: poor    MOVEMENT LOSS - Lumbar   Norms ROM Loss Initial   Flexion Fingers touch toes, sacral angle >/= 70 deg, uniform spinal curvature, posterior weight shift  within functional limits and poor curve reversal   Extension ASIS surpasses toes, spine of scapulae surpasses heels, uniform spinal curve " minimal loss   Side glide Right  within functional limits   Side glide Left  within functional limits   Rotation Right PT observes contralateral shoulder within functional limits   Rotation Left PT observes contralateral shoulder within functional limits     Lower Extremity Strength  Right LE  Left LE    Hip flexion: 5/5 Hip flexion: 5/5   Hip extension:  4+/5 Hip extension: 4+/5   Hip abduction: 4+/5 Hip abduction: 4+/5   Hip adduction:  5/5 Hip adduction:  5/5   Hip External Rotation 4+/5 Hip External Rotation 4+/5   Hip Internal rotation   4+/5 Hip Internal rotation 4+/5   Knee Flexion 5/5 Knee Flexion 5/5   Knee Extension 5/5 Knee Extension 5/5   Ankle dorsiflexion: 4+/5 Ankle dorsiflexion: 4+/5   Ankle plantarflexion: 4+/5 Ankle plantarflexion: 4+/5   Great toe extension  0/5 Great toe extension 4/5   great    GAIT:  Assistive Device used: none  Level of Assistance: independent  Patient displays the following gait deviations: no gait deviations observed.     Special Tests:   Test Name  Test Result   Prone Instability Test (--)   SI Joint Provocation Test (--)   Straight Leg Raise (--)   Neural Tension Test (--)   Crossed Straight Leg Raise (--)   Walking on toes Able   Walking on heels  Able     NEUROLOGICAL SCREENING:     Sensory deficits: paresthesias right lower extremity     Reflexes:    Left Right   Patella Tendon 1+ 1+   Achilles Tendon 1+ 1+   Babinski  NT NT   Clonus (--) (--)     REPEATED TEST MOVEMENTS:    Baseline symptoms:  Repeated Flexion in Standing no worse  no better   Repeated Extension in Standing no worse  no better   Repeated Flexion in lying no worse  no better   Repeated Extension in lying  no worse  no better       STATIC TESTS and other movements:   Prone lie no effect   Prone lie on elbows no effect   Sitting slouched  no effect   Sitting erect better   Standing slouched no effect   Standing erect  no effect     Lumbar testing Visit 2      Intake Outcome Measure for FOTO Lumbar  "Survey    Therapist reviewed FOTO scores for Ck Chavez on 10/8/2024.   FOTO report - see Media section or FOTO account episode details.    Intake Score: 46% functional ability  Goal: 59% functional ability           Treatment     Total Treatment time separate from Evaluation: 20 minutes    Ck received therapeutic exercises to develop/improve posture, lumbar ROM, strength, and muscular endurance for 5 minutes including the following exercises:     Sciatic nerve glide x 10  Right piriformis stretch 20" x 3    Written Home Exercises Provided: yes.    HEP AS FOLLOWS:  Sciatic nerve glide, piriformis stretch     Exercises were reviewed and Ck was able to demonstrate them prior to the end of the session. Ck demonstrated fair  understanding of the education provided.     See EMR under Patient Instructions for exercises provided 10/8/2024.    MedX Testing:  MedX testing to be performed next visit        10/8/2024    10:30 AM   [facility]   Visit Number 1   VAS Pain Rating 4         Therapeutic Education/Activity provided for 15 minutes:   - Patient was given an [facility] Visit 1 handout which discusses the following:  - what to expect in therapy  - an overview of the program, including health coaching and wellness  - importance of spinal hygiene, proper posture, lifting mechanics, sleep quality, and nutrition/hydration   - Garcia roll trialed, recommended, and purchase information was provided.  - Patient received a handout regarding anticipated muscular soreness following the isometric test and strategies for management were reviewed with patient including stretching, using ice and scheduled rest.   - Patient received verbal education on the following:   - Healthy Back program   - purpose of the isometric test  - safe progression of lumbar strengthening, wellness approach, and systemic strengthening.   - safe usage of MedX machine and testing protocols.    Ck received cold pack for 0 minutes " to low back in [place].    Assessment     Ck is a 59 y.o. male referred to [facility] with a medical diagnosis of M51.16 (ICD-10-CM) - Lumbar disc disease with radiculopathy . Pt presents with constant right leg pain and numbness, nerve conduction impairments with absence of right great toe extension, decreased lumbar range of motion, postural deficits and impaired functional mobility with lifting and carrying objects in work situations.     Pain Pattern: pattern 3       Pt prognosis is Fair.     Pt will benefit from skilled outpatient Physical Therapy to address the deficits stated above and in the chart below, to provide pt/family education, and to maximize pt's level of independence. Based on the above history and physical examination an active physical therapy program is recommended.      Plan of care discussed with patient: Yes  Pt's spiritual, cultural and educational needs considered and patient is agreeable to the plan of care and goals as stated below:     Anticipated Barriers for therapy: chronicity of impairments, language barrier    PT Evaluation Completed? Yes    Medical necessity is demonstrated by the following problem list:    History  Co-morbidities and personal factors that may impact the plan of care [] LOW: no personal factors / co-morbidities  [x] MODERATE: 1-2 personal factors / co-morbidities  [] HIGH: 3+ personal factors / co-morbidities    Moderate / High Support Documentation:   Co-morbidities affecting plan of care: HTN    Personal Factors:   education level     Examination  Body Structures and Functions, activity limitations and participation restrictions that may impact the plan of care [x] LOW: addressing 1-2 elements  [] MODERATE: 3+ elements  [] HIGH: 4+ elements (please support below)    Moderate / High Support Documentation:     Clinical Presentation [x] LOW: stable  [] MODERATE: Evolving  [] HIGH: Unstable     Decision Making/ Complexity Score: low         GOALS: Pt is in  agreement with the following goals.    Short term goals:  6 weeks or 10 visits   - Pt will demonstrate increased lumbar MedX ROM by at least 3 degrees from the initial ROM value with improvements noted in functional ROM and ability to perform ADLs. Appropriate and Ongoing  - Pt will demonstrate increased MedX average isometric strength value by 15% from initial test resulting in improved ability to perform bending, lifting, and carrying activities safely, confidently. Appropriate and Ongoing  - Pt will report a reduction in worst pain score by 1-2 points for improved tolerance for work duties. Appropriate and Ongoing  - Pt able to perform HEP correctly with minimal cueing or supervision from therapist to encourage independent management of symptoms. Appropriate and Ongoing    Long term goals: 10 weeks or 20 visits   - Pt will demonstrate increased lumbar MedX ROM by at least 9 degrees from initial ROM value, resulting in improved ability to perform functional forward bending while standing and sitting. Appropriate and Ongoing  - Pt will demonstrate increased MedX average isometric strength value by 25% from initial test resulting in improved ability to perform bending, lifting, and carrying activities safely and confidently. Appropriate and Ongoing  - Pt to demonstrate ability to independently control and reduce their pain through posture positioning and mechanical movements throughout a typical day. Appropriate and Ongoing  - Pt will demonstrate reduced pain and improved functional outcomes as reported on the FOTO by reaching an intake score of >/= 59% functional ability in order to demonstrate subjective improvement in patient's condition. . Appropriate and Ongoing  - Pt will demonstrate independence with the HEP at discharge. Appropriate and Ongoing      Plan     Outpatient physical therapy 2x week for 10 weeks or 20 visits to include the following:   - Patient education  - Therapeutic exercise  - Manual  therapy  - Performance testing   - Neuromuscular Re-education  - Therapeutic activity   - Modalities    Pt may be seen by PTA as part of the rehabilitation team.     Therapist: Tamiko Ramon, PT  10/8/2024

## 2024-10-09 PROBLEM — R29.898 DECREASED STRENGTH OF TRUNK AND BACK: Status: ACTIVE | Noted: 2024-10-09

## 2024-10-10 ENCOUNTER — OFFICE VISIT (OUTPATIENT)
Dept: PRIMARY CARE CLINIC | Facility: CLINIC | Age: 59
End: 2024-10-10
Payer: COMMERCIAL

## 2024-10-10 ENCOUNTER — TELEPHONE (OUTPATIENT)
Dept: NEUROSURGERY | Facility: CLINIC | Age: 59
End: 2024-10-10
Payer: MEDICAID

## 2024-10-10 VITALS
HEART RATE: 76 BPM | HEIGHT: 67 IN | BODY MASS INDEX: 22.15 KG/M2 | DIASTOLIC BLOOD PRESSURE: 74 MMHG | OXYGEN SATURATION: 98 % | SYSTOLIC BLOOD PRESSURE: 148 MMHG | RESPIRATION RATE: 18 BRPM | WEIGHT: 141.13 LBS

## 2024-10-10 DIAGNOSIS — M51.16 LUMBAR DISC DISEASE WITH RADICULOPATHY: Primary | ICD-10-CM

## 2024-10-10 DIAGNOSIS — E27.9 ADRENAL NODULE: ICD-10-CM

## 2024-10-10 DIAGNOSIS — E78.5 HYPERLIPIDEMIA, UNSPECIFIED HYPERLIPIDEMIA TYPE: ICD-10-CM

## 2024-10-10 DIAGNOSIS — E11.9 TYPE 2 DIABETES MELLITUS WITHOUT COMPLICATION, WITHOUT LONG-TERM CURRENT USE OF INSULIN: ICD-10-CM

## 2024-10-10 DIAGNOSIS — I10 PRIMARY HYPERTENSION: ICD-10-CM

## 2024-10-10 DIAGNOSIS — E27.8 OTHER SPECIFIED DISORDERS OF ADRENAL GLAND: ICD-10-CM

## 2024-10-10 DIAGNOSIS — Z72.0 TOBACCO USE: ICD-10-CM

## 2024-10-10 PROCEDURE — 3078F DIAST BP <80 MM HG: CPT | Mod: CPTII,S$GLB,, | Performed by: INTERNAL MEDICINE

## 2024-10-10 PROCEDURE — 1160F RVW MEDS BY RX/DR IN RCRD: CPT | Mod: CPTII,S$GLB,, | Performed by: INTERNAL MEDICINE

## 2024-10-10 PROCEDURE — 99214 OFFICE O/P EST MOD 30 MIN: CPT | Mod: S$GLB,,, | Performed by: INTERNAL MEDICINE

## 2024-10-10 PROCEDURE — 3008F BODY MASS INDEX DOCD: CPT | Mod: CPTII,S$GLB,, | Performed by: INTERNAL MEDICINE

## 2024-10-10 PROCEDURE — 99999 PR PBB SHADOW E&M-EST. PATIENT-LVL IV: CPT | Mod: PBBFAC,,, | Performed by: INTERNAL MEDICINE

## 2024-10-10 PROCEDURE — 4010F ACE/ARB THERAPY RXD/TAKEN: CPT | Mod: CPTII,S$GLB,, | Performed by: INTERNAL MEDICINE

## 2024-10-10 PROCEDURE — 3077F SYST BP >= 140 MM HG: CPT | Mod: CPTII,S$GLB,, | Performed by: INTERNAL MEDICINE

## 2024-10-10 PROCEDURE — 1159F MED LIST DOCD IN RCRD: CPT | Mod: CPTII,S$GLB,, | Performed by: INTERNAL MEDICINE

## 2024-10-10 RX ORDER — AMLODIPINE AND BENAZEPRIL HYDROCHLORIDE 5; 40 MG/1; MG/1
1 CAPSULE ORAL DAILY
Qty: 90 CAPSULE | Refills: 3 | Status: SHIPPED | OUTPATIENT
Start: 2024-10-10 | End: 2025-10-10

## 2024-10-10 RX ORDER — METFORMIN HYDROCHLORIDE 500 MG/1
500 TABLET, EXTENDED RELEASE ORAL 2 TIMES DAILY
Qty: 180 TABLET | Refills: 3 | Status: SHIPPED | OUTPATIENT
Start: 2024-10-10

## 2024-10-10 RX ORDER — ATORVASTATIN CALCIUM 10 MG/1
10 TABLET, FILM COATED ORAL NIGHTLY
Qty: 90 TABLET | Refills: 3 | Status: SHIPPED | OUTPATIENT
Start: 2024-10-10

## 2024-10-10 NOTE — TELEPHONE ENCOUNTER
Called and confirmed appointment for patient on:    Future Appointments   Date Time Provider Department Center   10/10/2024  4:00 PM Tino Hannah MD [facility]   10/15/2024  9:00 AM Tamiko Ramon PT [facility] [place]   10/24/2024 11:00 AM [facility]   11/5/2024 11:30 AM Nimesh Randall PA [facility]

## 2024-10-10 NOTE — Clinical Note
Patient need MRI of the adrenal gland and physical therapy for back pain please make appointment for patient thank you

## 2024-10-11 ENCOUNTER — PATIENT MESSAGE (OUTPATIENT)
Dept: ADMINISTRATIVE | Facility: HOSPITAL | Age: 59
End: 2024-10-11
Payer: COMMERCIAL

## 2024-10-15 ENCOUNTER — CLINICAL SUPPORT (OUTPATIENT)
Dept: REHABILITATION | Facility: HOSPITAL | Age: 59
End: 2024-10-15
Attending: NEUROLOGICAL SURGERY
Payer: COMMERCIAL

## 2024-10-15 DIAGNOSIS — R29.898 DECREASED STRENGTH OF TRUNK AND BACK: Primary | ICD-10-CM

## 2024-10-15 PROCEDURE — 97112 NEUROMUSCULAR REEDUCATION: CPT

## 2024-10-15 PROCEDURE — 97110 THERAPEUTIC EXERCISES: CPT

## 2024-10-15 NOTE — PROGRESS NOTES
"[facility]      Name: Ck Chavez  Clinic Number: [id number]    Therapy Diagnosis:   Encounter Diagnosis   Name Primary?    Decreased strength of trunk and back Yes     Physician: Irvin Sims MD    Visit Date: 10/15/2024    Physician Orders: PT Eval and Treat  Medical Diagnosis from Referral: M51.16 (ICD-10-CM) - Lumbar disc disease with radiculopathy   Evaluation Date: 10/8/2024  Authorization Period Expiration: 12/30/2024  Plan of Care Expiration: 12/17/2024  Reassessment Due: 11/8/2024  Visit # / Visits authorized: 2 /11 (10 + eval)  MedX testing visit 2    Time In: 9:08 AM  Time Out: 10:05  Total Billable Time: 50 minutes  INSURANCE and OUTCOMES: Fee for Service with FOTO Outcomes 1/3    Precautions: Standard, Diabetes, and HTN    Pattern of pain determined: pattern 3    Subjective   Ck reports "a little bit of pain" in his right leg, some pain but numbness.     IMN translation service used t/o session: [employer] #[id number]    Patient reports tolerating previous visit without adverse response  Patient reports their pain to be  4 /10 on a 0-10 scale with 0 being no pain and 10 being the worst pain imaginable.  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, laid off about 3 months ago, used to work maintenance in school  Leisure:   Pt goals: get my toe to work again    Objective     Lumbar IM Testing Results:     Date of testing: Initial 10/15/24   ROM 0-36 deg   Max Peak Torque 74    Min Peak Torque 41    Flex/Ext Ratio 1.8:1   % below normative data 65%     Outcomes:  Initial score: 46% functional ability   Visit 5 score:  Goal: 59% functional ability       Treatment    Ck received the treatments listed below:      Ck received neuromuscular education  MedX testing performed day 2: Patient  received neuromuscular education to engage spinal musculature correctly for motor control and engagement of musculature for 15 minutes including the MedX exercise " "component and practice and standard testing. MedX dynamic exercise and baseline isometric test performed with instructions to guide the patient safely through the testing procedure. Patient instructed to perform isometric test correctly and safely while building to an optimal force with a pain-free effort. Patient also instructed that they should feel support/pressure from MedX restraints but no pain/discomfort, and encouraged to report any pain to therapist. Patient demonstrated appropriate understanding of information and tolerance of test.  Education regarding purpose of test, safety during test given, and reviewed possible more soreness and strategies.           10/15/2024     4:09 PM   [facility] Therapy   Visit Number 2   VAS Pain Rating 4   Time 5   Flexion in Lying 10   Lumbar Extension Seat Pad 1   Femur Restraint 6   Top Dead Center 24   Counterweight 249   Lumbar Flexion 36   Lumbar Extension 0   Lumbar Peak Torque 74 ft. lbs.   Min Torque 41   Test Percent Below Normative Data 65 %   Ice - Z Lie (in min.) 0         therapeutic exercises to develop strength, endurance, ROM, flexibility, posture, and core stabilization for 35 minutes including:    LTR x 10  DKTC with ball x 10  Piriformis stretch 20" x 2  Sciatic nerve glide x 15 right lower extremity       Peripheral muscle strengthening which included 1 set of 15-20 repetitions at a slow, controlled 10-13 second per rep pace focused on strengthening supporting musculature for improved body mechanics and functional mobility.  Pt and therapist focused on proper form during treatment to ensure optimal strengthening of each targeted muscle group.  Machines were utilized including torso rotation, leg press, hip abd and hip add, leg ext.  Leg curl, triceps, biceps, chest and row added visit 3    therapeutic activities to improve functional performance for 0  minutes, including:    cold pack for 0 minutes to low back.    Home Exercises Provided and Patient " Education Provided   Home exercises include: piriformis stretch, sciatic nerve glide  Cardio program: planned for visit 5  Lifting education date: planned for visit 11  Posture/Lumbar roll:  recommended  Fridge Magnet Discharge handout (date given):  Equipment at home/gym membership: no      Education provided:   - PT role and POC  - HEP  - MedX testing results  - pacing and extension hold for max strength and endurance gains  - RPE scale    Written Home Exercises Provided: Patient instructed to cont prior HEP.  Exercises were reviewed and Ck was able to demonstrate them prior to the end of the session.  Ck demonstrated fair  understanding of the education provided.     See EMR under Patient Instructions for exercises provided prior visit.          Assessment     Pt presents to second healthy back visit reporting right leg burning and numbness, he was able to demo HEP with Mod VC for form. Isometric strength testing performed today. He demonstrates 65% strength deficits as compared to men the same age, height and weight.  Pt was also able to complete half of the peripheral strengthening exercises without increased discomfort and will complete the complete circuit next visit as tolerated.    Patient is making fair progress towards established goals.  Pt will continue to benefit from skilled outpatient physical therapy to address the deficits stated in the impairment chart, provide pt/family education and to maximize pt's level of independence in the home and community environment.     Anticipated Barriers for therapy: chronicity of impairments, language barrier   Pt's spiritual, cultural and educational needs considered and pt agreeable to plan of care and goals as stated below:       Goals:   Short term goals:  6 weeks or 10 visits   - Pt will demonstrate increased lumbar MedX ROM by at least 3 degrees from the initial ROM value with improvements noted in functional ROM and ability to perform ADLs. Appropriate  and Ongoing  - Pt will demonstrate increased MedX average isometric strength value by 15% from initial test resulting in improved ability to perform bending, lifting, and carrying activities safely, confidently. Appropriate and Ongoing  - Pt will report a reduction in worst pain score by 1-2 points for improved tolerance for work duties. Appropriate and Ongoing  - Pt able to perform HEP correctly with minimal cueing or supervision from therapist to encourage independent management of symptoms. Appropriate and Ongoing     Long term goals: 10 weeks or 20 visits   - Pt will demonstrate increased lumbar MedX ROM by at least 9 degrees from initial ROM value, resulting in improved ability to perform functional forward bending while standing and sitting. Appropriate and Ongoing  - Pt will demonstrate increased MedX average isometric strength value by 25% from initial test resulting in improved ability to perform bending, lifting, and carrying activities safely and confidently. Appropriate and Ongoing  - Pt to demonstrate ability to independently control and reduce their pain through posture positioning and mechanical movements throughout a typical day. Appropriate and Ongoing  - Pt will demonstrate reduced pain and improved functional outcomes as reported on the FOTO by reaching an intake score of >/= 59% functional ability in order to demonstrate subjective improvement in patient's condition. . Appropriate and Ongoing  - Pt will demonstrate independence with the HEP at discharge. Appropriate and Ongoing      Plan   Continue with established Plan of Care towards established PT goals.       Therapist: Tamiko Ramon, PT  10/15/2024

## 2024-10-15 NOTE — PROGRESS NOTES
Subjective:       Patient ID: Ck Chavez is a 59 y.o. male.    Chief Complaint: Follow-up (3 month) and Medication Refill    Follow-up  Pertinent negatives include no abdominal pain or chest pain.   Medication Refill  Pertinent negatives include no abdominal pain or chest pain.     Patient with history of hypertension hyperlipidemia diabetes mellitus chronic back pain with radiculopathy patient has been seen by Neurosurgery MRI show spinal stenosis and foramen stenosis but also far a incidental finding a rt adrenal gland nodule 1.4 cm need further details study patient also request to have physical therapy to be done in [place]  to try for his back pain before further intervention he denies short of breath chest pain dyspnea with exertion he is still smoking not able to quit yet  Review of Systems   Constitutional:  Negative for unexpected weight change.   Eyes:  Negative for visual disturbance.   Respiratory:  Negative for shortness of breath.    Cardiovascular:  Negative for chest pain.   Gastrointestinal:  Negative for abdominal pain.   Musculoskeletal:  Positive for back pain.   Psychiatric/Behavioral:  Negative for dysphoric mood.        Objective:      Physical Exam  Vitals and nursing note reviewed.   Constitutional:       General: He is not in acute distress.     Appearance: He is well-developed.   HENT:      Head: Normocephalic and atraumatic.      Right Ear: External ear normal.      Left Ear: External ear normal.      Nose: Nose normal.   Eyes:      Extraocular Movements: Extraocular movements intact.      Conjunctiva/sclera: Conjunctivae normal.      Pupils: Pupils are equal, round, and reactive to light.   Neck:      Thyroid: No thyromegaly.   Cardiovascular:      Rate and Rhythm: Normal rate and regular rhythm.      Heart sounds: Normal heart sounds. No murmur heard.     No friction rub. No gallop.   Pulmonary:      Effort: Pulmonary effort is normal. No respiratory distress.      Breath sounds:  Normal breath sounds. No wheezing.   Abdominal:      General: Bowel sounds are normal. There is no distension.      Palpations: Abdomen is soft.      Tenderness: There is no abdominal tenderness.   Musculoskeletal:         General: No tenderness or deformity. Normal range of motion.      Cervical back: Normal range of motion and neck supple.   Lymphadenopathy:      Cervical: No cervical adenopathy.   Skin:     General: Skin is warm and dry.      Findings: No erythema or rash.   Neurological:      Mental Status: He is alert and oriented to person, place, and time.   Psychiatric:         Mood and Affect: Mood normal.         Thought Content: Thought content normal.         Judgment: Judgment normal.         Assessment:       1. Lumbar disc disease with radiculopathy    2. Type 2 diabetes mellitus without complication, without long-term current use of insulin    3. Hyperlipidemia, unspecified hyperlipidemia type    4. Primary hypertension    5. Tobacco use    6. Adrenal nodule    7. Other specified disorders of adrenal gland        Plan:       Lumbar disc disease with radiculopathy  Comments:  Patient want to try physical therapy locally  Orders:  -     Ambulatory referral/consult to Physical/Occupational Therapy; Future; Expected date: 10/21/2024    Type 2 diabetes mellitus without complication, without long-term current use of insulin  -     metFORMIN (GLUCOPHAGE-XR) 500 MG ER 24hr tablet; Take 1 tablet (500 mg total) by mouth 2 (two) times daily.  Dispense: 180 tablet; Refill: 3    Hyperlipidemia, unspecified hyperlipidemia type  -     atorvastatin (LIPITOR) 10 MG tablet; Take 1 tablet (10 mg total) by mouth every evening.  Dispense: 90 tablet; Refill: 3    Primary hypertension  Comments:  Well control with diet medication  Orders:  -     amLODIPine-benazepriL (LOTREL) 5-40 mg per capsule; Take 1 capsule by mouth once daily.  Dispense: 90 capsule; Refill: 3    Tobacco use  Comments:  Discussed smoking cessation  program will consult  Orders:  -     Ambulatory referral/consult to Smoking Cessation Program; Future; Expected date: 10/21/2024    Adrenal nodule  Comments:  Try to get MRI with adrenal gland protocol  Orders:  -     MRI Abdomen Without Contrast; Future; Expected date: 10/14/2024    Other specified disorders of adrenal gland  -     MRI Abdomen Without Contrast; Future; Expected date: 10/14/2024        Medication List with Changes/Refills   Changed and/or Refilled Medications    Modified Medication Previous Medication    AMLODIPINE-BENAZEPRIL (LOTREL) 5-40 MG PER CAPSULE amLODIPine-benazepriL (LOTREL) 5-40 mg per capsule       Take 1 capsule by mouth once daily.    Take 1 capsule by mouth once daily.    ATORVASTATIN (LIPITOR) 10 MG TABLET atorvastatin (LIPITOR) 10 MG tablet       Take 1 tablet (10 mg total) by mouth every evening.    Take 1 tablet (10 mg total) by mouth every evening.    METFORMIN (GLUCOPHAGE-XR) 500 MG ER 24HR TABLET metFORMIN (GLUCOPHAGE-XR) 500 MG ER 24hr tablet       Take 1 tablet (500 mg total) by mouth 2 (two) times daily.    Take 1 tablet (500 mg total) by mouth 2 (two) times daily.

## 2024-10-22 ENCOUNTER — TELEPHONE (OUTPATIENT)
Dept: PRIMARY CARE CLINIC | Facility: CLINIC | Age: 59
End: 2024-10-22
Payer: COMMERCIAL

## 2024-10-22 DIAGNOSIS — R04.2 HEMOPTYSIS: Primary | ICD-10-CM

## 2024-10-22 NOTE — TELEPHONE ENCOUNTER
Called patient and he is coughing a lot.  He coughed up some blood this morning. He will like an order for xray of lungs.

## 2024-10-22 NOTE — TELEPHONE ENCOUNTER
----- Message from Lydia sent at 10/22/2024 11:56 AM CDT -----  Contact: [phone]  [email or web address]     Patient is calling for Medical Advice regarding: Pt coughing up a lil blood and wants a ray     How long has patient had these symptoms:    Pharmacy name and phone#:    Patient wants a call back or [facility]:call back     Comments:    Please advise patient replies from provider may take up to 48 hours.

## 2024-10-24 ENCOUNTER — PROCEDURE VISIT (OUTPATIENT)
Dept: NEUROLOGY | Facility: CLINIC | Age: 59
End: 2024-10-24
Payer: COMMERCIAL

## 2024-10-24 DIAGNOSIS — M51.16 LUMBAR DISC DISEASE WITH RADICULOPATHY: ICD-10-CM

## 2024-10-24 PROCEDURE — 95886 MUSC TEST DONE W/N TEST COMP: CPT | Mod: S$GLB,,, | Performed by: PSYCHIATRY & NEUROLOGY

## 2024-10-24 PROCEDURE — 95910 NRV CNDJ TEST 7-8 STUDIES: CPT | Mod: S$GLB,,, | Performed by: PSYCHIATRY & NEUROLOGY

## 2024-10-29 ENCOUNTER — CLINICAL SUPPORT (OUTPATIENT)
Dept: REHABILITATION | Facility: HOSPITAL | Age: 59
End: 2024-10-29
Payer: COMMERCIAL

## 2024-10-29 DIAGNOSIS — R29.898 DECREASED STRENGTH OF TRUNK AND BACK: Primary | ICD-10-CM

## 2024-10-29 PROCEDURE — 97110 THERAPEUTIC EXERCISES: CPT

## 2024-10-29 PROCEDURE — 97112 NEUROMUSCULAR REEDUCATION: CPT

## 2024-11-05 ENCOUNTER — CLINICAL SUPPORT (OUTPATIENT)
Dept: REHABILITATION | Facility: HOSPITAL | Age: 59
End: 2024-11-05
Payer: COMMERCIAL

## 2024-11-05 ENCOUNTER — OFFICE VISIT (OUTPATIENT)
Dept: NEUROSURGERY | Facility: CLINIC | Age: 59
End: 2024-11-05
Payer: COMMERCIAL

## 2024-11-05 DIAGNOSIS — M51.16 LUMBAR DISC DISEASE WITH RADICULOPATHY: Primary | ICD-10-CM

## 2024-11-05 DIAGNOSIS — R29.898 DECREASED STRENGTH OF TRUNK AND BACK: Primary | ICD-10-CM

## 2024-11-05 PROCEDURE — 1160F RVW MEDS BY RX/DR IN RCRD: CPT | Mod: CPTII,S$GLB,, | Performed by: PHYSICIAN ASSISTANT

## 2024-11-05 PROCEDURE — 97110 THERAPEUTIC EXERCISES: CPT

## 2024-11-05 PROCEDURE — 4010F ACE/ARB THERAPY RXD/TAKEN: CPT | Mod: CPTII,S$GLB,, | Performed by: PHYSICIAN ASSISTANT

## 2024-11-05 PROCEDURE — 99999 PR PBB SHADOW E&M-EST. PATIENT-LVL II: CPT | Mod: PBBFAC,,, | Performed by: PHYSICIAN ASSISTANT

## 2024-11-05 PROCEDURE — 99213 OFFICE O/P EST LOW 20 MIN: CPT | Mod: S$GLB,,, | Performed by: PHYSICIAN ASSISTANT

## 2024-11-05 PROCEDURE — 97112 NEUROMUSCULAR REEDUCATION: CPT

## 2024-11-05 PROCEDURE — 1159F MED LIST DOCD IN RCRD: CPT | Mod: CPTII,S$GLB,, | Performed by: PHYSICIAN ASSISTANT

## 2024-11-05 NOTE — PROGRESS NOTES
Neurosurgery  Established Patient    SUBJECTIVE:     Interval History:  Mr. Chavez is here today for follow up after EMG nerve conduction study.  Patient reports ongoing right-sided radiculopathy overall similar to when he was last seen by Dr. Sims.  Patient has longstanding right foot weakness that has not worsened acutely.  Patient reports some mild intermittent pain and paresthesias in the left lower extremity, however this is not giving him any significant trouble.  Patient denies any weakness in the left lower extremity. Denies bowel/bladder dysfunction.  Denies balance difficulty.       Review of patient's allergies indicates:  No Known Allergies    Current Outpatient Medications   Medication Sig Dispense Refill    amLODIPine-benazepriL (LOTREL) 5-40 mg per capsule Take 1 capsule by mouth once daily. 90 capsule 3    atorvastatin (LIPITOR) 10 MG tablet Take 1 tablet (10 mg total) by mouth every evening. 90 tablet 3    metFORMIN (GLUCOPHAGE-XR) 500 MG ER 24hr tablet Take 1 tablet (500 mg total) by mouth 2 (two) times daily. 180 tablet 3     No current facility-administered medications for this visit.       Past Medical History:   Diagnosis Date    Diabetes mellitus     Hypertension      Past Surgical History:   Procedure Laterality Date    COLONOSCOPY N/A 7/10/2024    Procedure: COLONOSCOPY;  Surgeon: Blue Tristan MD;  Location: [facility];  Service: Endoscopy;  Laterality: N/A;     Family History    None       Social History     Socioeconomic History    Marital status: Single   Tobacco Use    Smoking status: Every Day     Current packs/day: 1.00     Types: Cigarettes     Passive exposure: Current    Smokeless tobacco: Never   Substance and Sexual Activity    Alcohol use: Yes     Alcohol/week: 3.0 standard drinks of alcohol     Types: 3 Cans of beer per week     Comment: 2-3 Weekly    Drug use: Not Currently     Social Drivers of Health     Financial Resource Strain: Medium Risk (10/20/2024)    Overall  Financial Resource Strain (CARDIA)     Difficulty of Paying Living Expenses: Somewhat hard   Food Insecurity: No Food Insecurity (10/20/2024)    Hunger Vital Sign     Worried About Running Out of Food in the Last Year: Never true     Ran Out of Food in the Last Year: Never true   Physical Activity: Insufficiently Active (10/20/2024)    Exercise Vital Sign     Days of Exercise per Week: 2 days     Minutes of Exercise per Session: 10 min   Stress: Stress Concern Present (10/20/2024)    [employer] [place] [employer] - Occupational Stress Questionnaire     Feeling of Stress : To some extent   Housing Stability: Unknown (10/20/2024)    Housing Stability Vital Sign     Unable to Pay for Housing in the Last Year: No       OBJECTIVE:       Neurosurgery Physical Exam     General: Awake, Alert, Oriented x 3, NAD  Cranial nerves: II- XII are grossly intact  Sensory: response to light touch throughout  Motor Strength:baseline 3/5 in right DF/EHL otherwise, full strength upper and lower extremities  Pronator Drift: no drift noted  Ambulates in room without assistance     Diagnostic Results: personally reviewed  EXAMINATION:  MRI LUMBAR SPINE WITHOUT CONTRAST     CLINICAL HISTORY:  Low back pain, symptoms persist with > 6wks conservative treatment; Dorsalgia, unspecified     TECHNIQUE:  Multiplanar, multisequence MR images were acquired from the thoracolumbar junction to the sacrum without contrast.     COMPARISON:  Radiographs 06/07/2024     FINDINGS:  Alignment: Straightening of lordosis.     Vertebrae: No fracture or marrow infiltrative process.     Discs: Disc heights are maintained.  Multilevel disc desiccation noted.  Annular fissures seen at L3-L4, L4-L5 and L5-S1.  No evidence for discitis.     Cord: Conus terminates at L1 and appears unremarkable.  Cauda equina appears unremarkable.     Degenerative findings:     T12-L1: No spinal canal stenosis or neuroforaminal narrowing.     L1-L2: No spinal canal stenosis  or neural foraminal narrowing.     L2-L3: Circumferential disc bulge and moderate facet arthropathy.  No spinal canal stenosis or neural foraminal narrowing.     L3-L4: Circumferential disc bulge with small central protrusion and mild facet arthropathy.  No spinal canal stenosis or neural foraminal narrowing.     L4-L5: Circumferential disc bulge with central protrusion and mild facet arthropathy result in moderate effacement of the bilateral lateral recesses and moderate left, mild right neural foraminal narrowing.     L5-S1: Circumferential disc bulge with central protrusion and mild facet arthropathy result in mild effacement of the bilateral lateral recesses and mild right, moderate left neural foraminal narrowing.     Paraspinal muscles & soft tissues: T2 hyperintense lesion measuring 1.4 cm noted in the right adrenal gland, indeterminate.     Impression:     1. Multilevel degenerative changes of the lumbar spine detailed above.  Moderate neural foraminal narrowing noted at L4-S1.  Moderate effacement of the lateral recesses noted at L4-L5.  2. 1.4 cm T2 hyperintense right adrenal nodule.  Recommend further evaluation with dedicated adrenal protocol MRI or CT.        Electronically signed by:Christiano Snyder MD  Date:                                            09/03/2024  Time:                                           08:44    ASSESSMENT/PLAN:     60 yo male with multilevel degenerative disc disease most significant at L4-5 L5-S1. EMG nerve conduction study shows chronic denervation in the right L5 myotome and acute on chronic denervation in the left L5 myotome.  Patient is currently symptomatic only from right-sided radiculopathy, and chronic right DF/EHL weakness.  He has full strength on left side.  We will plan for transforaminal epidural steroid injection on the right.  And have follow-up after this is done. Encouraged to call the clinic with any questions or concerns in the meantime.      Nimesh BRIDGES  Jr. DIPTI Randall  [facility]  Department of Neurosurgery     Note dictated with voice recognition software, please excuse any grammatical errors.

## 2024-11-05 NOTE — PROGRESS NOTES
[facility]      Name: Ck Chavez  Clinic Number: [id number]    Therapy Diagnosis:   Encounter Diagnosis   Name Primary?    Decreased strength of trunk and back Yes     Physician: Irvin Sims MD    Visit Date: 11/5/2024    Physician Orders: PT Eval and Treat  Medical Diagnosis from Referral: M51.16 (ICD-10-CM) - Lumbar disc disease with radiculopathy   Evaluation Date: 10/8/2024  Authorization Period Expiration: 12/30/2024  Plan of Care Expiration: 12/17/2024  Reassessment Due: 11/8/2024  Visit # / Visits authorized: 4 /11 (10 + eval)  MedX testing visit 2    Time In: 9:00 AM  Time Out: 10:00 AM  Total Billable Time: 55 minutes  INSURANCE and OUTCOMES: Fee for Service with FOTO Outcomes 1/3    Precautions: Standard, Diabetes, and HTN    Pattern of pain determined: pattern 3    Subjective   Ck reports numbness and pain in low back at start of session.     Patient reports tolerating previous visit without adverse response  Patient reports their pain to be  3-4 /10 on a 0-10 scale with 0 being no pain and 10 being the worst pain imaginable.  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, laid off about 3 months ago, used to work maintenance in school  Leisure:   Pt goals: get my toe to work again    Objective     Lumbar IM Testing Results:     Date of testing: Initial 10/15/24   ROM 0-36 deg   Max Peak Torque 74    Min Peak Torque 41    Flex/Ext Ratio 1.8:1   % below normative data 65%     Outcomes:  Initial score: 46% functional ability   Visit 5 score:  Goal: 59% functional ability       Treatment    Ck received the treatments listed below:      Ck received neuromuscular education to isolate and engage spinal stabilization musculature correctly for motor control and coordination to aid in function and posture for 10 minutes on the Medical Medx Machine.  Patient performed MedX dynamic exercise with emphasis on spinal muscular control using pacer  "throughout  active range of motion. Therapist assisted patient in achieving optimal exertion for neural reeducation and endurance training by using the  Kaylee Exertion Rating scale, by instructing the patient to aim for mid range of exertion, performing 15-20 repetitions, slowly, correctly,and safely.          11/5/2024    10:11 AM   [facility]   Visit Number 4   VAS Pain Rating 4   Time 5   Lumbar Stretches - Slouch Overcorrection 10   Extension in Lying 10   Extension in Standing 10   Flexion in Lying 10   Lumbar Weight 41 lbs   Repetitions 15   Rating of Perceived Exertion 5   Ice - Z Lie (in min.) 0         therapeutic exercises to develop strength, endurance, ROM, flexibility, posture, and core stabilization for 45 minutes including:    LTR x 10  DKTC with ball x 10  Piriformis stretch 20" x 2  Sciatic nerve glide x 15 right lower extremity   EIS x 10  transverse abdominal isometric + SLR x 15  bridge with GTB x 20  SL clam with GTB x 15  shuttle hip extension with 1 band x 15  + SOC x 10  + EIL x 10    Peripheral muscle strengthening which included 1 set of 15-20 repetitions at a slow, controlled 10-13 second per rep pace focused on strengthening supporting musculature for improved body mechanics and functional mobility.  Pt and therapist focused on proper form during treatment to ensure optimal strengthening of each targeted muscle group.  Machines were utilized including torso rotation, leg press, hip abd and hip add, leg ext.  Leg curl, triceps, biceps, chest and row added visit 3    therapeutic activities to improve functional performance for 0  minutes, including:    cold pack for 0 minutes to low back.    Home Exercises Provided and Patient Education Provided   Home exercises include: piriformis stretch, sciatic nerve glide  Cardio program: planned for visit 5  Lifting education date: planned for visit 11  Posture/Lumbar roll:  recommended  Fridge Magnet Discharge handout (date given):  Equipment " at home/gym membership: no      Education provided:   - PT role and POC  - HEP  - MedX testing results  - pacing and extension hold for max strength and endurance gains  - RPE scale    Written Home Exercises Provided: Patient instructed to cont prior HEP.  Exercises were reviewed and Ck was able to demonstrate them prior to the end of the session.  Ck demonstrated fair  understanding of the education provided.     See EMR under Patient Instructions for exercises provided prior visit.          Assessment     Pt presents to fourth [facility] visit reporting mild low back pain and continued right leg numbness.  Treatment continued with flexibility, strengthening and neuromuscular reeducation exercises.  Lumbar MedX was increased to 41 ft/lbs and patient was able to complete 15 reps at 5/10 RPE.  Pt was also able to complete all of the peripheral strengthening exercises without increased discomfort. Pt was progressed with the addition of slouch corrects and extension in prone.  He was able to complete exercises without onset of low back pain or discomfort.     Patient is making fair progress towards established goals.  Pt will continue to benefit from skilled outpatient physical therapy to address the deficits stated in the impairment chart, provide pt/family education and to maximize pt's level of independence in the home and community environment.     Anticipated Barriers for therapy: chronicity of impairments, language barrier   Pt's spiritual, cultural and educational needs considered and pt agreeable to plan of care and goals as stated below:       Goals:   Short term goals:  6 weeks or 10 visits   - Pt will demonstrate increased lumbar MedX ROM by at least 3 degrees from the initial ROM value with improvements noted in functional ROM and ability to perform ADLs. Appropriate and Ongoing  - Pt will demonstrate increased MedX average isometric strength value by 15% from initial test resulting in improved  ability to perform bending, lifting, and carrying activities safely, confidently. Appropriate and Ongoing  - Pt will report a reduction in worst pain score by 1-2 points for improved tolerance for work duties. Appropriate and Ongoing  - Pt able to perform HEP correctly with minimal cueing or supervision from therapist to encourage independent management of symptoms. Appropriate and Ongoing     Long term goals: 10 weeks or 20 visits   - Pt will demonstrate increased lumbar MedX ROM by at least 9 degrees from initial ROM value, resulting in improved ability to perform functional forward bending while standing and sitting. Appropriate and Ongoing  - Pt will demonstrate increased MedX average isometric strength value by 25% from initial test resulting in improved ability to perform bending, lifting, and carrying activities safely and confidently. Appropriate and Ongoing  - Pt to demonstrate ability to independently control and reduce their pain through posture positioning and mechanical movements throughout a typical day. Appropriate and Ongoing  - Pt will demonstrate reduced pain and improved functional outcomes as reported on the FOTO by reaching an intake score of >/= 59% functional ability in order to demonstrate subjective improvement in patient's condition. . Appropriate and Ongoing  - Pt will demonstrate independence with the HEP at discharge. Appropriate and Ongoing      Plan   Continue with established Plan of Care towards established PT goals.       Therapist: Tamiko Ramon, PT  11/5/2024

## 2024-11-06 ENCOUNTER — PATIENT OUTREACH (OUTPATIENT)
Dept: ADMINISTRATIVE | Facility: HOSPITAL | Age: 59
End: 2024-11-06
Payer: COMMERCIAL

## 2024-11-06 ENCOUNTER — TELEPHONE (OUTPATIENT)
Dept: PRIMARY CARE CLINIC | Facility: CLINIC | Age: 59
End: 2024-11-06
Payer: COMMERCIAL

## 2024-11-06 NOTE — PROGRESS NOTES
Population Health Chart Review & Patient Outreach Details      Additional [facility] Health Notes:    Per LOCO in basket message, pt declined flu vaccine. HM updated.           Updates Requested / Reviewed:      Updated Care Coordination Note, Care Everywhere, and Immunizations Reconciliation Completed or Queried: [facility] Topics Overdue:      [facility] Score: 5     Urine Screening  Hemoglobin A1c  Lipid Panel  Foot Exam  Uncontrolled BP    RSV Vaccine                  Health Maintenance Topic(s) Outreach Outcomes & Actions Taken:

## 2024-11-06 NOTE — TELEPHONE ENCOUNTER
----- Message from Tino Hannah MD sent at 10/28/2024  4:40 AM CDT -----  Please call the patient regarding his CXR no acute change

## 2024-11-07 ENCOUNTER — CLINICAL SUPPORT (OUTPATIENT)
Dept: REHABILITATION | Facility: HOSPITAL | Age: 59
End: 2024-11-07
Payer: COMMERCIAL

## 2024-11-07 DIAGNOSIS — R29.898 DECREASED STRENGTH OF TRUNK AND BACK: Primary | ICD-10-CM

## 2024-11-07 PROCEDURE — 97110 THERAPEUTIC EXERCISES: CPT

## 2024-11-07 PROCEDURE — 97112 NEUROMUSCULAR REEDUCATION: CPT

## 2024-11-07 NOTE — PROGRESS NOTES
[facility]      Name: Ck Chavez  Clinic Number: [id number]    Therapy Diagnosis:   Encounter Diagnosis   Name Primary?    Decreased strength of trunk and back Yes     Physician: Irvin Sims MD    Visit Date: 11/7/2024    Physician Orders: PT Eval and Treat  Medical Diagnosis from Referral: M51.16 (ICD-10-CM) - Lumbar disc disease with radiculopathy   Evaluation Date: 10/8/2024  Authorization Period Expiration: 12/30/2024  Plan of Care Expiration: 12/17/2024  Reassessment Due: 11/8/2024  Visit # / Visits authorized: 5 /11 (10 + eval)  MedX testing visit 2    Time In: 9:00 AM  Time Out: 10:00 AM  Total Billable Time: 55 minutes  INSURANCE and OUTCOMES: Fee for Service with FOTO Outcomes 1/3    Precautions: Standard, Diabetes, and HTN    Pattern of pain determined: pattern 3    Subjective   Ck reports soreness in upper back from working out yesterday, burning in right leg.     Patient reports tolerating previous visit without adverse response  Patient reports their pain to be  3-4 /10 on a 0-10 scale with 0 being no pain and 10 being the worst pain imaginable.  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, laid off about 3 months ago, used to work maintenance in school  Leisure:   Pt goals: get my toe to work again    Objective     Lumbar IM Testing Results:     Date of testing: Initial 10/15/24   ROM 0-36 deg   Max Peak Torque 74    Min Peak Torque 41    Flex/Ext Ratio 1.8:1   % below normative data 65%     Outcomes:  Initial score: 46% functional ability   Visit 5 score:  Goal: 59% functional ability       Treatment    Ck received the treatments listed below:      Ck received neuromuscular education to isolate and engage spinal stabilization musculature correctly for motor control and coordination to aid in function and posture for 10 minutes on the Medical Medx Machine.  Patient performed MedX dynamic exercise with emphasis on spinal muscular  "control using pacer throughout  active range of motion. Therapist assisted patient in achieving optimal exertion for neural reeducation and endurance training by using the  Kaylee Exertion Rating scale, by instructing the patient to aim for mid range of exertion, performing 15-20 repetitions, slowly, correctly,and safely.          11/7/2024    10:06 AM   [facility]   Visit Number 5   VAS Pain Rating 3   Time 5   Lumbar Stretches - Slouch Overcorrection 10   Extension in Lying 10   Extension in Standing 10   Flexion in Lying 1   Lumbar Flexion 39   Lumbar Extension 0   Lumbar Weight 41 lbs   Repetitions 18   Rating of Perceived Exertion 3   Ice - Z Lie (in min.) 0         therapeutic exercises to develop strength, endurance, ROM, flexibility, posture, and core stabilization for 45 minutes including:    LTR x 10  DKTC with ball x 10  Piriformis stretch 20" x 2  Sciatic nerve glide x 15 right lower extremity   EIS x 10  transverse abdominal isometric + SLR x 15  bridge with GTB x 20  SL clam with GTB x 15  shuttle hip extension with 1 band x 15  SOC x 10  EIL x 10  + open book x 10  + clams with GTB x 15    Peripheral muscle strengthening which included 1 set of 15-20 repetitions at a slow, controlled 10-13 second per rep pace focused on strengthening supporting musculature for improved body mechanics and functional mobility.  Pt and therapist focused on proper form during treatment to ensure optimal strengthening of each targeted muscle group.  Machines were utilized including torso rotation, leg press, hip abd and hip add, leg ext.  Leg curl, triceps, biceps, chest and row added visit 3    therapeutic activities to improve functional performance for 0  minutes, including:    cold pack for 0 minutes to low back.    Home Exercises Provided and Patient Education Provided   Home exercises include: piriformis stretch, sciatic nerve glide  Cardio program: planned for visit 5  Lifting education date: planned for " visit 11  Posture/Lumbar roll:  recommended  Fridge Magnet Discharge handout (date given):  Equipment at home/gym membership: no      Education provided:   - PT role and POC  - HEP  - MedX testing results  - pacing and extension hold for max strength and endurance gains  - RPE scale    Written Home Exercises Provided: Patient instructed to cont prior HEP.  Exercises were reviewed and Ck was able to demonstrate them prior to the end of the session.  Ck demonstrated fair  understanding of the education provided.     See EMR under Patient Instructions for exercises provided prior visit.      Assessment     Pt presents to visit reporting mild low back pain and continued right leg numbness as well as general muscle soreness from going to the gym yesterday.  Treatment continued with flexibility, strengthening and neuromuscular reeducation exercises.  Lumbar MedX flexion was increased to 39 deg and resistance was  maintained at  41 ft/lbs and patient was able to complete 18 reps at 5/10 RPE.  Pt was also able to complete all of the peripheral strengthening exercises without increased discomfort. Pt was progressed with the addition of open books and clams today.  He was able to complete exercises without onset of low back pain or discomfort.     Patient is making fair progress towards established goals.  Pt will continue to benefit from skilled outpatient physical therapy to address the deficits stated in the impairment chart, provide pt/family education and to maximize pt's level of independence in the home and community environment.     Anticipated Barriers for therapy: chronicity of impairments, language barrier   Pt's spiritual, cultural and educational needs considered and pt agreeable to plan of care and goals as stated below:       Goals:   Short term goals:  6 weeks or 10 visits   - Pt will demonstrate increased lumbar MedX ROM by at least 3 degrees from the initial ROM value with improvements noted in  functional ROM and ability to perform ADLs. Appropriate and Ongoing  - Pt will demonstrate increased MedX average isometric strength value by 15% from initial test resulting in improved ability to perform bending, lifting, and carrying activities safely, confidently. Appropriate and Ongoing  - Pt will report a reduction in worst pain score by 1-2 points for improved tolerance for work duties. Appropriate and Ongoing  - Pt able to perform HEP correctly with minimal cueing or supervision from therapist to encourage independent management of symptoms. Appropriate and Ongoing     Long term goals: 10 weeks or 20 visits   - Pt will demonstrate increased lumbar MedX ROM by at least 9 degrees from initial ROM value, resulting in improved ability to perform functional forward bending while standing and sitting. Appropriate and Ongoing  - Pt will demonstrate increased MedX average isometric strength value by 25% from initial test resulting in improved ability to perform bending, lifting, and carrying activities safely and confidently. Appropriate and Ongoing  - Pt to demonstrate ability to independently control and reduce their pain through posture positioning and mechanical movements throughout a typical day. Appropriate and Ongoing  - Pt will demonstrate reduced pain and improved functional outcomes as reported on the FOTO by reaching an intake score of >/= 59% functional ability in order to demonstrate subjective improvement in patient's condition. . Appropriate and Ongoing  - Pt will demonstrate independence with the HEP at discharge. Appropriate and Ongoing      Plan   Continue with established Plan of Care towards established PT goals.       Therapist: Tamiko Ramon, PT  11/7/2024

## 2024-11-19 ENCOUNTER — CLINICAL SUPPORT (OUTPATIENT)
Dept: REHABILITATION | Facility: HOSPITAL | Age: 59
End: 2024-11-19
Payer: COMMERCIAL

## 2024-11-19 DIAGNOSIS — R29.898 DECREASED STRENGTH OF TRUNK AND BACK: Primary | ICD-10-CM

## 2024-11-19 PROCEDURE — 97112 NEUROMUSCULAR REEDUCATION: CPT

## 2024-11-19 PROCEDURE — 97110 THERAPEUTIC EXERCISES: CPT

## 2024-11-19 NOTE — PROGRESS NOTES
"[facility]      Name: Ck Chavez  Clinic Number: [id number]    Therapy Diagnosis:   Encounter Diagnosis   Name Primary?    Decreased strength of trunk and back Yes     Physician: Irvin Sims MD    Visit Date: 11/19/2024    Physician Orders: PT Eval and Treat  Medical Diagnosis from Referral: M51.16 (ICD-10-CM) - Lumbar disc disease with radiculopathy   Evaluation Date: 10/8/2024  Authorization Period Expiration: 12/30/2024  Plan of Care Expiration: 12/17/2024  Reassessment Due: 12/17/2024  Visit # / Visits authorized: 6 /11 (10 + eval)  MedX testing visit 2    Time In: 9:00 AM  Time Out: 9:50 AM  Total Billable Time: 50 minutes  INSURANCE and OUTCOMES: Fee for Service with FOTO Outcomes 1/3    Precautions: Standard, Diabetes, and HTN    Pattern of pain determined: pattern 3    Subjective   Ck reports whole body achy pain due to rain and weather changes, numbness in right leg is "a little" better.    Patient reports tolerating previous visit without adverse response  Patient reports their pain to be  3 /10 on a 0-10 scale with 0 being no pain and 10 being the worst pain imaginable.  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, laid off about 3 months ago, used to work maintenance in school  Leisure:   Pt goals: get my toe to work again    Objective     MOVEMENT LOSS - Lumbar    Norms ROM Loss Initial 11/20/24   Flexion Fingers touch toes, sacral angle >/= 70 deg, uniform spinal curvature, posterior weight shift  within functional limits and poor curve reversal WFL, poor curve reversal   Extension ASIS surpasses toes, spine of scapulae surpasses heels, uniform spinal curve minimal loss Min loss   Side glide Right   within functional limits WFL   Side glide Left   within functional limits WFL   Rotation Right PT observes contralateral shoulder within functional limits WFL   Rotation Left PT observes contralateral shoulder within functional limits WFL " "       Lumbar IM Testing Results:     Date of testing: Initial 10/15/24   ROM 0-36 deg   Max Peak Torque 74    Min Peak Torque 41    Flex/Ext Ratio 1.8:1   % below normative data 65%     Outcomes:  Initial score: 46% functional ability   Visit 5 score:  Goal: 59% functional ability       Treatment    Ck received the treatments listed below:      Ck received neuromuscular education to isolate and engage spinal stabilization musculature correctly for motor control and coordination to aid in function and posture for 10 minutes on the Medical MedEQO Machine.  Patient performed MedX dynamic exercise with emphasis on spinal muscular control using pacer throughout  active range of motion. Therapist assisted patient in achieving optimal exertion for neural reeducation and endurance training by using the  Kaylee Exertion Rating scale, by instructing the patient to aim for mid range of exertion, performing 15-20 repetitions, slowly, correctly,and safely.          11/19/2024     9:52 AM   [facility]   Visit Number 6   VAS Pain Rating 3   Time 5   Lumbar Stretches - Slouch Overcorrection 10   Extension in Lying 10   Extension in Standing 10   Flexion in Lying 10   Lumbar Flexion 42   Lumbar Extension 0   Lumbar Weight 41 lbs   Repetitions 20   Rating of Perceived Exertion 2   Ice - Z Lie (in min.) 0         therapeutic exercises to develop strength, endurance, ROM, flexibility, posture, and core stabilization for 40 minutes including:    LTR x 10  DKTC with ball x 10  Piriformis stretch 20" x 2  Sciatic nerve glide x 15 right lower extremity   EIS x 10  transverse abdominal isometric + SLR  1.5# x 15  bridge with GTB x 20  SL clam with GTB x 15  shuttle hip extension with 1 band x 15 NP  SOC x 10  EIL x 10  open book x 10  clams with GTB x 15    Peripheral muscle strengthening which included 1 set of 15-20 repetitions at a slow, controlled 10-13 second per rep pace focused on strengthening supporting musculature for " improved body mechanics and functional mobility.  Pt and therapist focused on proper form during treatment to ensure optimal strengthening of each targeted muscle group.  Machines were utilized including torso rotation, leg press, hip abd and hip add, leg ext.  Leg curl, triceps, biceps, chest and row added visit 3    therapeutic activities to improve functional performance for 0  minutes, including:    cold pack for 0 minutes to low back.    Home Exercises Provided and Patient Education Provided   Home exercises include: piriformis stretch, sciatic nerve glide  Cardio program: planned for visit 5  Lifting education date: planned for visit 11  Posture/Lumbar roll:  recommended  Fridge Magnet Discharge handout (date given):  Equipment at home/gym membership: no      Education provided:   - PT role and POC  - HEP  - MedX testing results  - pacing and extension hold for max strength and endurance gains  - RPE scale    Written Home Exercises Provided: Patient instructed to cont prior HEP.  Exercises were reviewed and Ck was able to demonstrate them prior to the end of the session.  Ck demonstrated fair  understanding of the education provided.     See EMR under Patient Instructions for exercises provided prior visit.      Assessment     Pt presents to visit reporting mild low back pain and continued right leg numbness as well as general whole body achy pain due to weather.  Treatment continued with flexibility, strengthening and neuromuscular reeducation exercises.  Lumbar MedX flexion was increased to 42 deg and resistance was  maintained at  41 ft/lbs and patient was able to complete 20 reps at 2/10 RPE. Increase 10% next session.  Pt was also able to complete all of the peripheral strengthening exercises without increased discomfort. Pt was progressed with the addition ankle weights to straight leg raise today.  He was able to complete exercises without onset of low back pain or discomfort.     Patient is making  fair progress towards established goals.  Pt will continue to benefit from skilled outpatient physical therapy to address the deficits stated in the impairment chart, provide pt/family education and to maximize pt's level of independence in the home and community environment.     Anticipated Barriers for therapy: chronicity of impairments, language barrier   Pt's spiritual, cultural and educational needs considered and pt agreeable to plan of care and goals as stated below:       Goals:   Short term goals:  6 weeks or 10 visits   - Pt will demonstrate increased lumbar MedX ROM by at least 3 degrees from the initial ROM value with improvements noted in functional ROM and ability to perform ADLs. Appropriate and Ongoing  - Pt will demonstrate increased MedX average isometric strength value by 15% from initial test resulting in improved ability to perform bending, lifting, and carrying activities safely, confidently. Appropriate and Ongoing  - Pt will report a reduction in worst pain score by 1-2 points for improved tolerance for work duties. Appropriate and Ongoing  - Pt able to perform HEP correctly with minimal cueing or supervision from therapist to encourage independent management of symptoms. Appropriate and Ongoing     Long term goals: 10 weeks or 20 visits   - Pt will demonstrate increased lumbar MedX ROM by at least 9 degrees from initial ROM value, resulting in improved ability to perform functional forward bending while standing and sitting. Appropriate and Ongoing  - Pt will demonstrate increased MedX average isometric strength value by 25% from initial test resulting in improved ability to perform bending, lifting, and carrying activities safely and confidently. Appropriate and Ongoing  - Pt to demonstrate ability to independently control and reduce their pain through posture positioning and mechanical movements throughout a typical day. Appropriate and Ongoing  - Pt will demonstrate reduced pain and  improved functional outcomes as reported on the FOTO by reaching an intake score of >/= 59% functional ability in order to demonstrate subjective improvement in patient's condition. . Appropriate and Ongoing  - Pt will demonstrate independence with the HEP at discharge. Appropriate and Ongoing      Plan   Continue with established Plan of Care towards established PT goals.       Therapist: Tamiko Ramon, PT  11/19/2024

## 2024-11-21 ENCOUNTER — TELEPHONE (OUTPATIENT)
Dept: REHABILITATION | Facility: HOSPITAL | Age: 59
End: 2024-11-21
Payer: COMMERCIAL

## 2024-11-21 NOTE — TELEPHONE ENCOUNTER
Patient was a no show for his P.T. visit on 11/21/24. Spoke to patient by phone who advised that he cannot attend appt for today due to illness. Appt was rescheduled for Tues 12/3/24.

## 2024-12-03 ENCOUNTER — TELEPHONE (OUTPATIENT)
Dept: REHABILITATION | Facility: HOSPITAL | Age: 59
End: 2024-12-03
Payer: COMMERCIAL

## 2024-12-03 NOTE — TELEPHONE ENCOUNTER
Spoke with patient re: no show PT visit 12/3/24.  Patient explained that he has another appt in [place] today and will call back to reschedule.

## 2024-12-04 ENCOUNTER — TELEPHONE (OUTPATIENT)
Dept: PHARMACY | Facility: CLINIC | Age: 59
End: 2024-12-04
Payer: COMMERCIAL

## 2024-12-04 NOTE — TELEPHONE ENCOUNTER
[facility] Chart Review & Patient Outreach Details For Medication Adherence Project    Reason for Outreach Encounter: 3rd Party payor non-compliance report ([employer], [employer], C, etc)  2.  Patient Outreach Method: Reviewed Patient Chart  3.   Medication in question: lisinopril-hctz   LAST FILLED: n/a  Hypertension Medications               amLODIPine-benazepriL (LOTREL) 5-40 mg per capsule Take 1 capsule by mouth once daily.              4.  Reviewed and or Updates Made To: Patient Chart  5. Outreach Outcomes and/or actions taken: Medication discontinued    Additional Notes:

## 2024-12-05 ENCOUNTER — DOCUMENTATION ONLY (OUTPATIENT)
Dept: NEUROSURGERY | Facility: HOSPITAL | Age: 59
End: 2024-12-05
Payer: COMMERCIAL

## 2024-12-05 NOTE — PROGRESS NOTES
Spoke with patient who reports current pain score is 8/10. He attempted the healthy back program but was unable to complete this due to pain and difficulty walking. He is now participating in home exercise without relief. Will again refer him for previously recommended AILYN.      Stefanie Edwards PA-C  Neurosurgery

## 2024-12-06 ENCOUNTER — TELEPHONE (OUTPATIENT)
Dept: NEUROSURGERY | Facility: CLINIC | Age: 59
End: 2024-12-06
Payer: COMMERCIAL

## 2024-12-06 NOTE — TELEPHONE ENCOUNTER
Called and spoke to patient's wife, Maritza, to advise that we will have to reschedule patient's injections to a later date/time due to his insurance denial. Patient's wife verbalized understanding and asked that the department contact her from now on for scheduling. Advised that I will add that in staff message to IR staff.   weight-bearing as tolerated

## 2024-12-06 NOTE — TELEPHONE ENCOUNTER
----- Message from Summer sent at 12/6/2024  8:25 AM CST -----  .Type:  Patient Call Back    Who Called: PT WIFE CURTIS       Does the patient know what this is regarding?: PT INSURANCE DENIED HIM THE PROCEDURE FOR TODAY. SHE WOULD LIKE TO SPEAK WITH THE OFFICE ABOUT WHAT'S THE NEXT COURSE OF ACTION     Would the patient rather a call back YES     Best Call Back Number: [phone]    Additional Information: Thank You

## 2024-12-19 ENCOUNTER — PATIENT MESSAGE (OUTPATIENT)
Dept: ADMINISTRATIVE | Facility: HOSPITAL | Age: 59
End: 2024-12-19
Payer: MEDICAID

## 2024-12-24 ENCOUNTER — HOSPITAL ENCOUNTER (OUTPATIENT)
Dept: INTERVENTIONAL RADIOLOGY/VASCULAR | Facility: HOSPITAL | Age: 59
Discharge: HOME | End: 2024-12-24
Attending: NEUROLOGICAL SURGERY
Payer: MEDICAID

## 2024-12-24 VITALS
OXYGEN SATURATION: 98 % | SYSTOLIC BLOOD PRESSURE: 179 MMHG | HEART RATE: 82 BPM | RESPIRATION RATE: 14 BRPM | DIASTOLIC BLOOD PRESSURE: 108 MMHG

## 2024-12-24 DIAGNOSIS — M51.16 LUMBAR DISC DISEASE WITH RADICULOPATHY: ICD-10-CM

## 2024-12-24 PROCEDURE — 25500020 PHARM REV CODE 255: Performed by: RADIOLOGY

## 2024-12-24 PROCEDURE — 64483 NJX AA&/STRD TFRM EPI L/S 1: CPT | Mod: RT | Performed by: RADIOLOGY

## 2024-12-24 PROCEDURE — 63600175 PHARM REV CODE 636 W HCPCS: Performed by: RADIOLOGY

## 2024-12-24 RX ORDER — METHYLPREDNISOLONE ACETATE 40 MG/ML
INJECTION, SUSPENSION INTRA-ARTICULAR; INTRALESIONAL; INTRAMUSCULAR; SOFT TISSUE
Status: COMPLETED | OUTPATIENT
Start: 2024-12-24 | End: 2024-12-24

## 2024-12-24 RX ADMIN — METHYLPREDNISOLONE ACETATE 80 MG: 40 INJECTION, SUSPENSION INTRA-ARTICULAR; INTRALESIONAL; INTRAMUSCULAR; SOFT TISSUE at 11:12

## 2024-12-24 RX ADMIN — IOHEXOL 3 ML: 180 INJECTION INTRAVENOUS at 11:12

## 2024-12-24 NOTE — H&P
Interventional Neuroradiology Pre-Procedure Note    Chief Complaint: Back pain with right lumbar radiculopathy    History of Present Illness:  Ck Chavez is a 59 y.o. male with chronic low back pain and right lumbar radiculopathy is referred for lumbosacral AILYN.      Past Medical History:   Diagnosis Date    Diabetes mellitus     Hypertension      Past Surgical History:   Procedure Laterality Date    COLONOSCOPY N/A 7/10/2024    Procedure: COLONOSCOPY;  Surgeon: Blue Tristan MD;  Location: [facility];  Service: Endoscopy;  Laterality: N/A;       Home Meds:   Prior to Admission medications    Medication Sig Start Date End Date Taking? Authorizing Provider   amLODIPine-benazepriL (LOTREL) 5-40 mg per capsule Take 1 capsule by mouth once daily. 10/10/24 10/10/25  Tino Hannah MD   atorvastatin (LIPITOR) 10 MG tablet Take 1 tablet (10 mg total) by mouth every evening. 10/10/24   Tino Hannah MD   metFORMIN (GLUCOPHAGE-XR) 500 MG ER 24hr tablet Take 1 tablet (500 mg total) by mouth 2 (two) times daily. 10/10/24   Tino Hannah MD     Anticoagulants/Antiplatelets: no anticoagulation    Allergies: Review of patient's allergies indicates:  No Known Allergies  Sedation History:  no adverse reactions    Review of Systems:   Hematological: negative  no known coagulopathies  Respiratory: no cough, shortness of breath, or wheezing  no shortness of breath  Cardiovascular: no chest pain or dyspnea on exertion  no chest pain  Gastrointestinal: no abdominal pain, change in bowel habits, or black or bloody stools  no abdominal pain  Genito-Urinary: no dysuria, trouble voiding, or hematuria  no dysuria  Musculoskeletal: +Back pain with right lumbar radiculopathy  Neurological: no TIA or stroke symptoms         OBJECTIVE:     Vital Signs (Most Recent)       Physical Exam:  ASA: 2  Mallampati: 2    General: no acute distress  Mental Status: alert and oriented to person, place and time  HEENT: normocephalic,  "atraumatic  Chest: unlabored breathing  Heart: regular heart rate  Abdomen: nondistended  Extremity: moves all extremities    Laboratory  No results found for: "INR", "PT", "PTT"  No results found for: "WBC", "HGB", "HCT", "MCV", "PLT" No results found for: "GLU", "NA", "K", "CL", "CO2", "BUN", "CREATININE", "CALCIUM", "MG", "ALT", "AST", "ALBUMIN", "BILITOT", "BILIDIR"    ASSESSMENT/PLAN:   -Sedation Plan: local  -Patient will undergo: fluoroscopy guided transforaminal epidural steroid & lidocaine injection at right L4-L5, L5-S1 levels                  Black Dewitt MD, MHA  Fellow, NeuroEndovascular Surgery, [facility] [place]  Neurologist, [facility] [place], [place]          "

## 2024-12-24 NOTE — PROCEDURES
Interventional Neuroradiology Post-Procedure Note    Pre Op Diagnosis: LBP    Post Op Diagnosis: Same    Procedure: Lumbar Transforaminal AILYN    Procedure performed by: Justne Reyes MD; Esdras VOGT, Black    Written Informed Consent Obtained: Yes    Specimen Removed: NO    Estimated Blood Loss: Minimal    Findings:   Using usual sterile technique, lidocaine local anesthesia, and fluoroscopic guidance, a 22 gauge needle was inserted from a transforaminal approach into the right epidural space at L4-L5 level.  1 cc of Omnipaque 180 contrast was injected confirming position.  Injection was then performed with Depomedrol 40 mg mixed with lidocaine 1% 2 cc.  Patient tolerated this well. Needles were removed and hemostasis was achieved.      Later, a 22 gauge needle was inserted from a transforaminal approach into the right epidural space at L5-S1 level.  1 cc of Omnipaque 180 contrast was injected confirming position.  Injection was then performed with Depomedrol 40 mg mixed with lidocaine 1% 2 cc.  Patient tolerated this well. Needles were removed and hemostasis was achieved.      Level injected: Right L4-L5 and Right L5-S1  Needle used: 22 gauge  Dose:  40 mg Depo-methylprednisolone (at each level)   2 mL Lidocaine 1% MPF (at each level)    Patient tolerated procedure well.                    Black Dewitt MD, MHA  Fellow, NeuroEndovascular Surgery, [facility] [place]  Neurologist, [facility] [place], [place]

## 2024-12-24 NOTE — PLAN OF CARE
Pt arrived to  for AILYN. Pt oriented to unit and staff, Pt safely transferred from stretcher to procedural table. Fall risk reviewed and comfort measures utilized with interventions. Safety strap applied, position pillows to minimize pressure points. Blankets applied. Timeouts implemented utilizing standard universal time-out per department and facility policy. RN to remain at bedside. Pt resting comfortably. Denies pain/discomfort. Will continue to monitor. See flow sheets for monitoring, medication administration, and updates. patient verbalizes understanding.

## 2024-12-24 NOTE — DISCHARGE INSTRUCTIONS
For questions or concerns call: MON-FRI 8 AM- 5PM [phone]. Radiology resident on call [phone].

## 2024-12-27 ENCOUNTER — TELEPHONE (OUTPATIENT)
Dept: NEUROSURGERY | Facility: CLINIC | Age: 59
End: 2024-12-27
Payer: MEDICAID

## 2024-12-27 NOTE — TELEPHONE ENCOUNTER
Called and spoke to pt's spouse confirming r/s pt's appt w/ Dr. Sims on 12/31/2024 to:    Future Appointments   Date Time Provider Department Center   1/14/2025  1:00 PM Irvin Sims MD [facility] NEUROS[address] [place] [address]   1/21/2025  3:30 PM Tino Hannah MD [facility]       Will mail appt notice to address on file.

## 2025-01-09 DIAGNOSIS — E11.9 TYPE 2 DIABETES MELLITUS WITHOUT COMPLICATION, UNSPECIFIED WHETHER LONG TERM INSULIN USE: ICD-10-CM

## 2025-01-14 ENCOUNTER — OFFICE VISIT (OUTPATIENT)
Dept: NEUROSURGERY | Facility: CLINIC | Age: 60
End: 2025-01-14
Payer: MEDICAID

## 2025-01-14 DIAGNOSIS — M54.9 DORSALGIA, UNSPECIFIED: Primary | ICD-10-CM

## 2025-01-14 PROCEDURE — 99211 OFF/OP EST MAY X REQ PHY/QHP: CPT | Mod: PBBFAC | Performed by: NEUROLOGICAL SURGERY

## 2025-01-14 PROCEDURE — 4010F ACE/ARB THERAPY RXD/TAKEN: CPT | Mod: CPTII,,, | Performed by: NEUROLOGICAL SURGERY

## 2025-01-14 PROCEDURE — 99999 PR PBB SHADOW E&M-EST. PATIENT-LVL I: CPT | Mod: PBBFAC,,, | Performed by: NEUROLOGICAL SURGERY

## 2025-01-14 PROCEDURE — 1159F MED LIST DOCD IN RCRD: CPT | Mod: CPTII,,, | Performed by: NEUROLOGICAL SURGERY

## 2025-01-14 PROCEDURE — 99214 OFFICE O/P EST MOD 30 MIN: CPT | Mod: S$PBB,,, | Performed by: NEUROLOGICAL SURGERY

## 2025-01-21 ENCOUNTER — PATIENT MESSAGE (OUTPATIENT)
Dept: ADMINISTRATIVE | Facility: HOSPITAL | Age: 60
End: 2025-01-21
Payer: MEDICAID

## 2025-01-24 ENCOUNTER — TELEPHONE (OUTPATIENT)
Dept: NEUROSURGERY | Facility: CLINIC | Age: 60
End: 2025-01-24
Payer: MEDICAID

## 2025-01-24 NOTE — TELEPHONE ENCOUNTER
Called and spoke to pt's spouse advising that I will call back to schedule MRI/appt once clinic note is signed. Pt's spouse v/u.    ----- Message from Megan sent at 1/23/2025 11:09 AM CST -----  Regarding: Rx Advise  Contact: [phone]  CARLY MOSER calling regarding Patient Advice (message) for # pt wife is calling to speak with nurse regarding last visit with provider was going to send in medication and no medication was sent into pharmacy and also has questions about the MRI pt needs pls advise and call wife @ [phone]

## 2025-01-28 RX ORDER — NAPROXEN 500 MG/1
500 TABLET ORAL 2 TIMES DAILY WITH MEALS
Qty: 90 TABLET | Refills: 2 | Status: SHIPPED | OUTPATIENT
Start: 2025-01-28 | End: 2025-06-12

## 2025-01-28 RX ORDER — METHOCARBAMOL 750 MG/1
750 TABLET, FILM COATED ORAL 3 TIMES DAILY
Qty: 90 TABLET | Refills: 2 | Status: SHIPPED | OUTPATIENT
Start: 2025-01-28 | End: 2025-04-28

## 2025-01-28 RX ORDER — GABAPENTIN 300 MG/1
300 CAPSULE ORAL 3 TIMES DAILY
Qty: 90 CAPSULE | Refills: 0 | Status: SHIPPED | OUTPATIENT
Start: 2025-01-28 | End: 2025-02-27

## 2025-01-28 RX ORDER — METHYLPREDNISOLONE 4 MG/1
TABLET ORAL
Qty: 21 EACH | Refills: 0 | Status: SHIPPED | OUTPATIENT
Start: 2025-01-28

## 2025-01-28 NOTE — PROGRESS NOTES
Neurosurgery  Established Patient    SUBJECTIVE:     History of Present Illness    Patient presents today for follow up of chronic back pain. He has a history of multiple degenerative diseases at L4, L5, and L5/S1. He reports chronic bilateral radiculopathy for over 10 years, right side worse than left, with subjective right-sided weakness. His symptoms have worsened over the last several months. EMG showed chronic L5 radiculopathy. MRI from September showed minimal findings.      ROS:  Musculoskeletal: +back pain, +muscle weakness           Review of patient's allergies indicates:  No Known Allergies    Current Outpatient Medications   Medication Sig Dispense Refill    amLODIPine-benazepriL (LOTREL) 5-40 mg per capsule Take 1 capsule by mouth once daily. 90 capsule 3    atorvastatin (LIPITOR) 10 MG tablet Take 1 tablet (10 mg total) by mouth every evening. 90 tablet 3    metFORMIN (GLUCOPHAGE-XR) 500 MG ER 24hr tablet Take 1 tablet (500 mg total) by mouth 2 (two) times daily. 180 tablet 3    gabapentin (NEURONTIN) 300 MG capsule Take 1 capsule (300 mg total) by mouth 3 (three) times daily. 90 capsule 0    methocarbamoL (ROBAXIN) 750 MG Tab Take 1 tablet (750 mg total) by mouth 3 (three) times daily. 90 tablet 2    methylPREDNISolone (MEDROL DOSEPACK) 4 mg tablet use as directed 21 each 0    naproxen (EC NAPROSYN) 500 MG EC tablet Take 1 tablet (500 mg total) by mouth 2 (two) times daily with meals. 90 tablet 2     No current facility-administered medications for this visit.       Past Medical History:   Diagnosis Date    Diabetes mellitus     Hypertension      Past Surgical History:   Procedure Laterality Date    COLONOSCOPY N/A 7/10/2024    Procedure: COLONOSCOPY;  Surgeon: Blue Tristan MD;  Location: [facility];  Service: Endoscopy;  Laterality: N/A;     Family History    None       Social History     Socioeconomic History    Marital status: Single   Tobacco Use    Smoking status: Every Day     Current  packs/day: 1.00     Types: Cigarettes     Passive exposure: Current    Smokeless tobacco: Never   Substance and Sexual Activity    Alcohol use: Yes     Alcohol/week: 3.0 standard drinks of alcohol     Types: 3 Cans of beer per week     Comment: 2-3 Weekly    Drug use: Not Currently     Social Drivers of Health     Financial Resource Strain: Medium Risk (10/20/2024)    Overall Financial Resource Strain (CARDIA)     Difficulty of Paying Living Expenses: Somewhat hard   Food Insecurity: No Food Insecurity (10/20/2024)    Hunger Vital Sign     Worried About Running Out of Food in the Last Year: Never true     Ran Out of Food in the Last Year: Never true   Physical Activity: Insufficiently Active (10/20/2024)    Exercise Vital Sign     Days of Exercise per Week: 2 days     Minutes of Exercise per Session: 10 min   Stress: Stress Concern Present (10/20/2024)    [employer] - Occupational Stress Questionnaire     Feeling of Stress : To some extent   Housing Stability: Unknown (10/20/2024)    Housing Stability Vital Sign     Unable to Pay for Housing in the Last Year: No           OBJECTIVE:     Vital Signs  Pain Score:   7  There is no height or weight on file to calculate BMI.    Physical Exam                    Diagnostic Results:  EXAMINATION:  MRI LUMBAR SPINE WITHOUT CONTRAST     CLINICAL HISTORY:  Low back pain, symptoms persist with > 6wks conservative treatment; Dorsalgia, unspecified     TECHNIQUE:  Multiplanar, multisequence MR images were acquired from the thoracolumbar junction to the sacrum without contrast.     COMPARISON:  Radiographs 06/07/2024     FINDINGS:  Alignment: Straightening of lordosis.     Vertebrae: No fracture or marrow infiltrative process.     Discs: Disc heights are maintained.  Multilevel disc desiccation noted.  Annular fissures seen at L3-L4, L4-L5 and L5-S1.  No evidence for discitis.     Cord: Conus terminates at L1 and appears unremarkable.  Cauda equina  appears unremarkable.     Degenerative findings:     T12-L1: No spinal canal stenosis or neuroforaminal narrowing.     L1-L2: No spinal canal stenosis or neural foraminal narrowing.     L2-L3: Circumferential disc bulge and moderate facet arthropathy.  No spinal canal stenosis or neural foraminal narrowing.     L3-L4: Circumferential disc bulge with small central protrusion and mild facet arthropathy.  No spinal canal stenosis or neural foraminal narrowing.     L4-L5: Circumferential disc bulge with central protrusion and mild facet arthropathy result in moderate effacement of the bilateral lateral recesses and moderate left, mild right neural foraminal narrowing.     L5-S1: Circumferential disc bulge with central protrusion and mild facet arthropathy result in mild effacement of the bilateral lateral recesses and mild right, moderate left neural foraminal narrowing.     Paraspinal muscles & soft tissues: T2 hyperintense lesion measuring 1.4 cm noted in the right adrenal gland, indeterminate.     Impression:     1. Multilevel degenerative changes of the lumbar spine detailed above.  Moderate neural foraminal narrowing noted at L4-S1.  Moderate effacement of the lateral recesses noted at L4-L5.  2. 1.4 cm T2 hyperintense right adrenal nodule.  Recommend further evaluation with dedicated adrenal protocol MRI or CT.       ASSESSMENT/PLAN:     Assessment & Plan    IMPRESSION:  - Assessed patient with worsening right-sided radiculopathy in context of known multiple degenerative diseases at L4, L5, and L5/S1  - Determined need for updated imaging due to worsening symptoms and previous unremarkable MRI  - Opted for conservative management approach    LUMBAR INTERVERTEBRAL DISC DISPLACEMENT AND RADICULOPATHY:  - Started Medrol Dosepak.  - Started anti-inflammatories.  - MRI scan of the lumbar spine ordered.    FOLLOW-UP EXAMINATION:  - Follow up after MRI scan and medication course completed.               Note dictated  with voice recognition software, please excuse any grammatical errors.This note was generated with the assistance of ambient listening technology. Verbal consent was obtained by the patient and accompanying visitor(s) for the recording of patient appointment to facilitate this note. I attest to having reviewed and edited the generated note for accuracy, though some syntax or spelling errors may persist. Please contact the author of this note for any clarification.

## 2025-01-29 ENCOUNTER — TELEPHONE (OUTPATIENT)
Dept: NEUROSURGERY | Facility: CLINIC | Age: 60
End: 2025-01-29
Payer: MEDICAID

## 2025-01-29 NOTE — TELEPHONE ENCOUNTER
Called and spoke to pt's spouse confirming r/s appts to:    Future Appointments   Date Time Provider Department Center   2/26/2025  7:45 AM [facility]   2/26/2025  9:30 AM Irvin Sims MD [facility]   3/11/2025  3:30 PM Tino Hannah MD [facility]       ----- Message from Thania sent at 1/29/2025 11:40 AM CST -----  Regarding: Maritza called states need to resche the Pt's appt to the day before or after if possible  Contact: [phone]  Name of Who is Calling:Maritza        What is the request in detail:Maritza called states need to resche the Pt's appt to the day before or after if possible. Please advise         Can the clinic reply by [facility]:No        [facility]  [phone]

## 2025-02-26 ENCOUNTER — HOSPITAL ENCOUNTER (OUTPATIENT)
Dept: RADIOLOGY | Facility: HOSPITAL | Age: 60
Discharge: HOME | End: 2025-02-26
Attending: NEUROLOGICAL SURGERY
Payer: MEDICAID

## 2025-02-26 ENCOUNTER — OFFICE VISIT (OUTPATIENT)
Dept: NEUROSURGERY | Facility: CLINIC | Age: 60
End: 2025-02-26
Payer: MEDICAID

## 2025-02-26 DIAGNOSIS — M54.9 DORSALGIA, UNSPECIFIED: ICD-10-CM

## 2025-02-26 DIAGNOSIS — M51.16 LUMBAR DISC HERNIATION WITH RADICULOPATHY: ICD-10-CM

## 2025-02-26 DIAGNOSIS — M51.16 LUMBAR DISC DISEASE WITH RADICULOPATHY: Primary | ICD-10-CM

## 2025-02-26 DIAGNOSIS — M48.061 SPINAL STENOSIS OF LUMBAR REGION, UNSPECIFIED WHETHER NEUROGENIC CLAUDICATION PRESENT: ICD-10-CM

## 2025-02-26 DIAGNOSIS — M51.26 HNP (HERNIATED NUCLEUS PULPOSUS), LUMBAR: Primary | ICD-10-CM

## 2025-02-26 PROCEDURE — 99214 OFFICE O/P EST MOD 30 MIN: CPT | Mod: S$PBB,,, | Performed by: NEUROLOGICAL SURGERY

## 2025-02-26 PROCEDURE — 4010F ACE/ARB THERAPY RXD/TAKEN: CPT | Mod: CPTII,,, | Performed by: NEUROLOGICAL SURGERY

## 2025-02-26 PROCEDURE — 1159F MED LIST DOCD IN RCRD: CPT | Mod: CPTII,,, | Performed by: NEUROLOGICAL SURGERY

## 2025-02-26 PROCEDURE — 72148 MRI LUMBAR SPINE W/O DYE: CPT | Mod: 26,,, | Performed by: RADIOLOGY

## 2025-02-26 PROCEDURE — 99999 PR PBB SHADOW E&M-EST. PATIENT-LVL I: CPT | Mod: PBBFAC,,, | Performed by: NEUROLOGICAL SURGERY

## 2025-02-26 PROCEDURE — 99211 OFF/OP EST MAY X REQ PHY/QHP: CPT | Mod: PBBFAC | Performed by: NEUROLOGICAL SURGERY

## 2025-02-26 PROCEDURE — 72148 MRI LUMBAR SPINE W/O DYE: CPT | Mod: TC

## 2025-02-26 NOTE — PROGRESS NOTES
Neurosurgery  Established Patient    SCRIBE #1 NOTE: I, Yonug Denton, am scribing for, and in the presence of,  Irvin Sims. I have scribed the entire note.        SUBJECTIVE:     History of Present Illness:  Patient is a 59 year old male who we last saw at last evaluation on 1/14/2025. Patient has multiple level degenerative disc disease at L4-L5 L5-S1. We wanted to treat with conservative management. He is here after updated MRI scan. Today the patient presents with pain in the RLE radiating down his leg, as well as experiencing numbing in the top of his foot, with an onset of about a year. He also reports the left side hurts occasionally, though not as prominent as his right side.    Review of patient's allergies indicates:  No Known Allergies  Current Medications[1]  Past Medical History:   Diagnosis Date    Diabetes mellitus     Hypertension      Past Surgical History:   Procedure Laterality Date    COLONOSCOPY N/A 7/10/2024    Procedure: COLONOSCOPY;  Surgeon: Blue Tristan MD;  Location: [facility];  Service: Endoscopy;  Laterality: N/A;     Family History    None       Social History     Socioeconomic History    Marital status: Single   Tobacco Use    Smoking status: Every Day     Current packs/day: 1.00     Types: Cigarettes     Passive exposure: Current    Smokeless tobacco: Never   Substance and Sexual Activity    Alcohol use: Yes     Alcohol/week: 3.0 standard drinks of alcohol     Types: 3 Cans of beer per week     Comment: 2-3 Weekly    Drug use: Not Currently     Social Drivers of Health     Financial Resource Strain: Medium Risk (10/20/2024)    Overall Financial Resource Strain (CARDIA)     Difficulty of Paying Living Expenses: Somewhat hard   Food Insecurity: No Food Insecurity (10/20/2024)    Hunger Vital Sign     Worried About Running Out of Food in the Last Year: Never true     Ran Out of Food in the Last Year: Never true   Physical Activity: Insufficiently Active (10/20/2024)    Exercise Vital  Sign     Days of Exercise per Week: 2 days     Minutes of Exercise per Session: 10 min   Stress: Stress Concern Present (10/20/2024)    [employer] - Occupational Stress Questionnaire     Feeling of Stress : To some extent   Housing Stability: Unknown (10/20/2024)    Housing Stability Vital Sign     Unable to Pay for Housing in the Last Year: No     Review of Systems   Musculoskeletal:         Leg pain   Neurological:  Positive for weakness and numbness.        Foot numbness and weakness   All other systems reviewed and are negative.    OBJECTIVE:     Vital Signs     There is no height or weight on file to calculate BMI.  Physical Exam:    Constitutional: He appears well-developed and well-nourished. He is not diaphoretic. No distress.     Psych/Behavior: He is alert. He is oriented to person, place, and time. He has a normal mood and affect.     Diagnostic Results:    01) I have reviewed and independently interpreted the MRI Lumbar Spine Without Contrast, dated 2/26/2025 revealing:  He has multiple level disc disease, Most prominent at L4-L5 with bilateral foraminal stenosis    ASSESSMENT/PLAN:   Patient with intracted right L5 radiculopathy with some weakness in the right foot. Failed conservative management. Patient has multiple degenerative disc disease but the worst is at L4-L5 with BL foraminal stenosis with compression on the L5 nerve root. He has failed conservative management which has included physical therapy, medications including steroids, as well as injections. He has been symptomatic for close to a year now, and has foot weakness. We'll plan for hemilaminectomy and micro discectomy at L4-L5.     I have discussed the risks/benefits, indications, and alternatives for the proposed procedure in detail. I have answered all of their questions and patient wish to proceed with surgery. We will schedule patient.       I, SUSAN Sims, personally performed the services described in this  documentation. All medical record entries made by the scribe were at my direction and in my presence. I have reviewed the chart and agree that the record reflects my personal performance and is accurate and complete.     Note dictated with voice recognition software, please excuse any grammatical errors.         [1]   Current Outpatient Medications   Medication Sig Dispense Refill    amLODIPine-benazepriL (LOTREL) 5-40 mg per capsule Take 1 capsule by mouth once daily. 90 capsule 3    atorvastatin (LIPITOR) 10 MG tablet Take 1 tablet (10 mg total) by mouth every evening. 90 tablet 3    gabapentin (NEURONTIN) 300 MG capsule Take 1 capsule (300 mg total) by mouth 3 (three) times daily. 90 capsule 0    metFORMIN (GLUCOPHAGE-XR) 500 MG ER 24hr tablet Take 1 tablet (500 mg total) by mouth 2 (two) times daily. 180 tablet 3    methocarbamoL (ROBAXIN) 750 MG Tab Take 1 tablet (750 mg total) by mouth 3 (three) times daily. 90 tablet 2    methylPREDNISolone (MEDROL DOSEPACK) 4 mg tablet use as directed 21 each 0    naproxen (EC NAPROSYN) 500 MG EC tablet Take 1 tablet (500 mg total) by mouth 2 (two) times daily with meals. 90 tablet 2     No current facility-administered medications for this visit.

## 2025-02-26 NOTE — H&P (VIEW-ONLY)
Neurosurgery  Established Patient    SCRIBE #1 NOTE: I, Young Denton, am scribing for, and in the presence of,  Irvin Sims. I have scribed the entire note.        SUBJECTIVE:     History of Present Illness:  Patient is a 59 year old male who we last saw at last evaluation on 1/14/2025. Patient has multiple level degenerative disc disease at L4-L5 L5-S1. We wanted to treat with conservative management. He is here after updated MRI scan. Today the patient presents with pain in the RLE radiating down his leg, as well as experiencing numbing in the top of his foot, with an onset of about a year. He also reports the left side hurts occasionally, though not as prominent as his right side.    Review of patient's allergies indicates:  No Known Allergies  Current Medications[1]  Past Medical History:   Diagnosis Date    Diabetes mellitus     Hypertension      Past Surgical History:   Procedure Laterality Date    COLONOSCOPY N/A 7/10/2024    Procedure: COLONOSCOPY;  Surgeon: Blue Tristan MD;  Location: [facility];  Service: Endoscopy;  Laterality: N/A;     Family History    None       Social History     Socioeconomic History    Marital status: Single   Tobacco Use    Smoking status: Every Day     Current packs/day: 1.00     Types: Cigarettes     Passive exposure: Current    Smokeless tobacco: Never   Substance and Sexual Activity    Alcohol use: Yes     Alcohol/week: 3.0 standard drinks of alcohol     Types: 3 Cans of beer per week     Comment: 2-3 Weekly    Drug use: Not Currently     Social Drivers of Health     Financial Resource Strain: Medium Risk (10/20/2024)    Overall Financial Resource Strain (CARDIA)     Difficulty of Paying Living Expenses: Somewhat hard   Food Insecurity: No Food Insecurity (10/20/2024)    Hunger Vital Sign     Worried About Running Out of Food in the Last Year: Never true     Ran Out of Food in the Last Year: Never true   Physical Activity: Insufficiently Active (10/20/2024)    Exercise Vital  Sign     Days of Exercise per Week: 2 days     Minutes of Exercise per Session: 10 min   Stress: Stress Concern Present (10/20/2024)    [employer] [place] [employer] [employer] - Occupational Stress Questionnaire     Feeling of Stress : To some extent   Housing Stability: Unknown (10/20/2024)    Housing Stability Vital Sign     Unable to Pay for Housing in the Last Year: No     Review of Systems   Musculoskeletal:         Leg pain   Neurological:  Positive for weakness and numbness.        Foot numbness and weakness   All other systems reviewed and are negative.    OBJECTIVE:     Vital Signs     There is no height or weight on file to calculate BMI.  Physical Exam:    Constitutional: He appears well-developed and well-nourished. He is not diaphoretic. No distress.     Psych/Behavior: He is alert. He is oriented to person, place, and time. He has a normal mood and affect.     Diagnostic Results:    01) I have reviewed and independently interpreted the MRI Lumbar Spine Without Contrast, dated 2/26/2025 revealing:  He has multiple level disc disease, Most prominent at L4-L5 with bilateral foraminal stenosis    ASSESSMENT/PLAN:   Patient with intracted right L5 radiculopathy with some weakness in the right foot. Failed conservative management. Patient has multiple degenerative disc disease but the worst is at L4-L5 with BL foraminal stenosis with compression on the L5 nerve root. He has failed conservative management which has included physical therapy, medications including steroids, as well as injections. He has been symptomatic for close to a year now, and has foot weakness. We'll plan for hemilaminectomy and micro discectomy at L4-L5.     I have discussed the risks/benefits, indications, and alternatives for the proposed procedure in detail. I have answered all of their questions and patient wish to proceed with surgery. We will schedule patient.       I, SUSAN Sims, personally performed the services described in this  documentation. All medical record entries made by the scribe were at my direction and in my presence. I have reviewed the chart and agree that the record reflects my personal performance and is accurate and complete.     Note dictated with voice recognition software, please excuse any grammatical errors.         [1]   Current Outpatient Medications   Medication Sig Dispense Refill    amLODIPine-benazepriL (LOTREL) 5-40 mg per capsule Take 1 capsule by mouth once daily. 90 capsule 3    atorvastatin (LIPITOR) 10 MG tablet Take 1 tablet (10 mg total) by mouth every evening. 90 tablet 3    gabapentin (NEURONTIN) 300 MG capsule Take 1 capsule (300 mg total) by mouth 3 (three) times daily. 90 capsule 0    metFORMIN (GLUCOPHAGE-XR) 500 MG ER 24hr tablet Take 1 tablet (500 mg total) by mouth 2 (two) times daily. 180 tablet 3    methocarbamoL (ROBAXIN) 750 MG Tab Take 1 tablet (750 mg total) by mouth 3 (three) times daily. 90 tablet 2    methylPREDNISolone (MEDROL DOSEPACK) 4 mg tablet use as directed 21 each 0    naproxen (EC NAPROSYN) 500 MG EC tablet Take 1 tablet (500 mg total) by mouth 2 (two) times daily with meals. 90 tablet 2     No current facility-administered medications for this visit.

## 2025-02-27 DIAGNOSIS — Z01.818 PREOPERATIVE TESTING: Primary | ICD-10-CM

## 2025-02-27 NOTE — PRE-PROCEDURE INSTRUCTIONS
Patient gave me permission to speak with his wife, Maritza Chavez , about his medial. Instructed her to get an involvement in care form with next clinic or hospital visit.She stated  he has not had any problem with anesthesia in the past Will need labs, ua, and EKG. Our  will call to set up these appts.   She said they will be out of town for family emergency form 2/28 thru 3/9.   Preop instructions given. Hold aspirin, aspirin containing products, nsaids( Aleve, Advil, Motrin, Ibuprofen, Naprosyn, Naproxen, Voltaren, Diclofenac, Mobic, Meloxicam, Celebrex, Celecoxib), vitamins and supplements one week prior to surgery.    May take Tylenol.      Disp Refills Start End   amLODIPine-benazepriL (LOTREL) 5-40 mg per capsule 90 capsule 3 10/10/2024 10/10/2025   Sig: Take 1 capsule by mouth once daily.   Route: Oral       Nazia Rosenthal RN 2/27/2025 10:33 AM  HOLD IN AM OF SURGERY.            atorvastatin (LIPITOR) 10 MG tablet 90 tablet 3 10/10/2024 --   Sig: Take 1 tablet (10 mg total) by mouth every evening.   Route: Oral       Nazia Rosenthal RN 2/27/2025 10:33 AM  TAKE THE NIGHT BEFORE SURGERY.              gabapentin (NEURONTIN) 300 MG capsule 90 capsule 0 1/28/2025 2/27/2025   Sig: Take 1 capsule (300 mg total) by mouth 3 (three) times daily.   Route: Oral   Prior authorization: Payer Waiting for Response       Nazia Rosenthal RN 2/27/2025 10:33 AM  TAKE IN AM OF SURGERY.              metFORMIN (GLUCOPHAGE-XR) 500 MG ER 24hr tablet 180 tablet 3 10/10/2024 --   Sig: Take 1 tablet (500 mg total) by mouth 2 (two) times daily.   Route: Nazia Danielle RN 2/27/2025 10:34 AM  HOLD IN PM BEFORE SURGERY. HOLD N AM OF SURGERY.            methocarbamoL (ROBAXIN) 750 MG Tab 90 tablet 2 1/28/2025 4/28/2025   Sig: Take 1 tablet (750 mg total) by mouth 3 (three) times daily.   Route: Oral       Nazia Rosenthal RN 2/27/2025 10:34 AM  TAKE IN AM OF SURGERY.              naproxen (EC NAPROSYN) 500 MG EC tablet 90 tablet 2  1/28/2025 6/12/2025   Sig: Take 1 tablet (500 mg total) by mouth 2 (two) times daily with meals.   Route: Oral       Nazia Rosenthal RN 2/27/2025 10:32 AM  HOLD ONE WEEK PRIOR TO SURGERY.            Your surgery has been scheduled for:_Thursday 3/13/2025_________________________________________  [facility] ( [facility]) [phone]  You should report to:  ____[facility], located on the [place] side of the first floor of the           [facility] ([phone])  __x__[facility], located on the [facility] side of the            Second floor of the [facility] ([phone])  ____3rd Floor [facility] located on the [facility] side of the [facility] [phone]  Please Note   Tell your doctor if you take Aspirin, products containing Aspirin, herbal medications  or blood thinners, such as Coumadin, Ticlid, or Plavix.  (Consult your provider regarding holding or stopping before surgery).  Arrange for someone to drive you home following surgery.  You will not be allowed to leave the surgical facility alone or drive yourself home following sedation and anesthesia.  Before Surgery  Stop taking all vitamins/ herbal medications 14days prior to surgery  No Motrin/Advil (Ibuprofen) 7 days before surgery  No Aleve (Naproxen) 7 days before surgery  Stop Taking Asprin, products containing Asprin __7___days before surgery  Stop taking blood thinners_______days before surgery  No Goody's/BC  Powder 7 days before surgery  Refrain from drinking alcoholic beverages for 24hours before and after surgery  Stop or limit smoking ____7_____days before surgery. No smoking the day of surgery  You may take Tylenol for pain  Night before Surgery        Nothing to eat or drink after midnight. May have clear liquids( water or apple juice) up until 2 hours before your arrival time.  Take a shower or bath (shower is recommended).  Bathe with Hibiclens soap  or an antibacterial soap from the neck down.  If not supplied by your surgeon, hibiclens soap will need to be purchased over the counter in pharmacy.  Rinse soap off thoroughly.  Shampoo your hair with your regular shampoo  The Day of Surgery  NOTHING TO  DRINK 2 hours before arrival time. If you are told to take medication on the morning of surgery, it may be taken with a sip of water.   Take another bath or shower with hibiclens or any antibacterial soap, to reduce the chance of infection.  Take heart and blood pressure medications with a small sip of water, as advised by the perioperative team.  Do not take fluid pills  You may brush your teeth and rinse your mouth, but do not swall any additional water.   Do not apply perfumes, powder, body lotions or deodorant on the day of surgery.  Nail polish should be removed.  Do not wear makeup or moisturizer  Wear comfortable clothes, such as a button front shirt and loose fitting pants.  Leave all jewelry, including body piercings, and valuables at home.    Bring any devices you will neeed after surgery such as crutches or canes.  If you have sleep apnea, please bring your CPAP machine  In the event that your physical condition changes including the onset of a cold or respiratory illness, or if you have to delay or cancel your surgery, please notify your surgeon.   Stated knows where the surgery enter is locate. Also mailed preop and med instructions to patient and his wife. Verbalizes understanding..

## 2025-02-27 NOTE — ANESTHESIA PAT ROS NOTE
02/27/2025  Ck Chavez is a 59 y.o., male.      Pre-op Assessment          Review of Systems  Anesthesia Hx:  No problems with previous Anesthesia             Denies Family Hx of Anesthesia complications.    Denies Personal Hx of Anesthesia complications.                    Social:  Smoker, Social Alcohol Use WIFE STATED HAS STOPPED SMOKING FOR 2 WEEKS AS OF 2/27/2025      Hematology/Oncology:  Hematology Normal   Oncology Normal                                   EENT/Dental:  EENT/Dental Normal           Cardiovascular:            Denies Angina.     hyperlipidemia       Functional Capacity 3.5 METS, ABLE TOP CLIMB 2 FLIGHTS OF STAIRS SLOWLY                   Hypertension  , Recent typical clinic B/P of 148/74       Pulmonary:  Pulmonary Normal      Denies Shortness of breath.  Denies Recent URI.                 Renal/:  Renal/ Normal                 Hepatic/GI:  Hepatic/GI Normal                    Musculoskeletal:   Musculoskeletal General/Symptoms:  Functional capacity is ambulatory without assistance.          Lumbar Spine Disorders, Lumbar Disc Disease, Radiculopathy L-DDD, LUMBAR SPINAL STENOSIS, HNP- LUMBAR  Neurological:   Neuro Symptoms of pain OF BACK AND RLE Denies Pain                                 Endocrine:   Diabetes, Type 2 Diabetes   , controlled by oral hypoglycemics. Typical AM glucose range: DOES NOT CHECK AT HOME                  Psych:  Psychiatric Normal                         Anesthesia Assessment: Preoperative EQUATION    Planned Procedure: Procedure(s) (LRB):  DISCECTOMY, SPINE, LUMBAR L4-5 (Right)  Requested Anesthesia Type:General  Surgeon: Irvin Sims MD  Service: Neurosurgery  Known or anticipated Date of Surgery:3/13/2025,Date change 3/17/2025    Surgeon notes: reviewed    Electronic QUestionnaire Assessment completed via nurse interview with patient.        Triage  considerations:     The patient has no apparent active cardiac condition (No unstable coronary Syndrome such as severe unstable angina or recent [<1 month] myocardial infarction, decompensated CHF, severe valvular   disease or significant arrhythmia)    Previous anesthesia records:MAC and No problems  7/10/2024  COLONOSCOPY   Airway:  Mallampati: I   Mouth Opening: Normal  TM Distance: Normal  Tongue: Normal  Neck ROM: Normal ROM  Airway Placement Date: 07/10/24 Placement Time: 0717 Airway Device: Nasal Cannula     Last PCP note: 3-6 months ago , within [facility]   Subspecialty notes: Neurosurgery    Other important co-morbidities: DM2, HLD, HTN, Smoker, and LUMBAR SPINAL STENOSIS       Tests already available:  Available tests,  within 1 month , within [facility] .   2/26/2025 MRI LUMBAR SPINE W/O CONTRAST          Instructions given. (See in Nurse's note)    Optimization:  Anesthesia Preop Clinic Assessment  - not Indicated for this surgery      Plan:    Testing:  BMP, EKG, Hematology Profile, PT/INR, PTT, T&S, and UA         Patient  has previously scheduled Medical Appointment:3/11 Dr Hannah    Navigation: Tests Scheduled. TBD             Consults scheduled.TBD             Results will be tracked by Preop Clinic.  3/10 Labs, UA, and EKG resulted and noted by Dr. Alda Cuba.  Nazia Rosenthal RN BSN

## 2025-03-10 ENCOUNTER — HOSPITAL ENCOUNTER (OUTPATIENT)
Dept: CARDIOLOGY | Facility: CLINIC | Age: 60
Discharge: HOME | End: 2025-03-10
Payer: MEDICAID

## 2025-03-10 DIAGNOSIS — Z01.818 PREOPERATIVE TESTING: ICD-10-CM

## 2025-03-10 LAB
OHS QRS DURATION: 90 MS
OHS QTC CALCULATION: 397 MS

## 2025-03-10 PROCEDURE — 93010 ELECTROCARDIOGRAM REPORT: CPT | Mod: S$PBB,,, | Performed by: INTERNAL MEDICINE

## 2025-03-10 PROCEDURE — 93005 ELECTROCARDIOGRAM TRACING: CPT | Mod: PBBFAC | Performed by: INTERNAL MEDICINE

## 2025-03-11 ENCOUNTER — OFFICE VISIT (OUTPATIENT)
Dept: PRIMARY CARE CLINIC | Facility: CLINIC | Age: 60
End: 2025-03-11
Payer: MEDICAID

## 2025-03-11 VITALS
HEIGHT: 67 IN | DIASTOLIC BLOOD PRESSURE: 70 MMHG | BODY MASS INDEX: 21.68 KG/M2 | SYSTOLIC BLOOD PRESSURE: 108 MMHG | OXYGEN SATURATION: 97 % | RESPIRATION RATE: 16 BRPM | WEIGHT: 138.13 LBS | HEART RATE: 103 BPM

## 2025-03-11 DIAGNOSIS — J01.00 ACUTE NON-RECURRENT MAXILLARY SINUSITIS: Primary | ICD-10-CM

## 2025-03-11 DIAGNOSIS — M51.16 LUMBAR DISC DISEASE WITH RADICULOPATHY: ICD-10-CM

## 2025-03-11 DIAGNOSIS — E11.9 TYPE 2 DIABETES MELLITUS WITHOUT COMPLICATION, WITHOUT LONG-TERM CURRENT USE OF INSULIN: ICD-10-CM

## 2025-03-11 DIAGNOSIS — Z11.59 NEED FOR HEPATITIS C SCREENING TEST: ICD-10-CM

## 2025-03-11 DIAGNOSIS — I10 PRIMARY HYPERTENSION: ICD-10-CM

## 2025-03-11 DIAGNOSIS — E78.5 HYPERLIPIDEMIA, UNSPECIFIED HYPERLIPIDEMIA TYPE: ICD-10-CM

## 2025-03-11 PROCEDURE — 3008F BODY MASS INDEX DOCD: CPT | Mod: CPTII,,, | Performed by: INTERNAL MEDICINE

## 2025-03-11 PROCEDURE — 1159F MED LIST DOCD IN RCRD: CPT | Mod: CPTII,,, | Performed by: INTERNAL MEDICINE

## 2025-03-11 PROCEDURE — 1160F RVW MEDS BY RX/DR IN RCRD: CPT | Mod: CPTII,,, | Performed by: INTERNAL MEDICINE

## 2025-03-11 PROCEDURE — 99999 PR PBB SHADOW E&M-EST. PATIENT-LVL III: CPT | Mod: PBBFAC,,, | Performed by: INTERNAL MEDICINE

## 2025-03-11 PROCEDURE — 4010F ACE/ARB THERAPY RXD/TAKEN: CPT | Mod: CPTII,,, | Performed by: INTERNAL MEDICINE

## 2025-03-11 PROCEDURE — 99213 OFFICE O/P EST LOW 20 MIN: CPT | Mod: PBBFAC,PN | Performed by: INTERNAL MEDICINE

## 2025-03-11 PROCEDURE — 3078F DIAST BP <80 MM HG: CPT | Mod: CPTII,,, | Performed by: INTERNAL MEDICINE

## 2025-03-11 PROCEDURE — 3074F SYST BP LT 130 MM HG: CPT | Mod: CPTII,,, | Performed by: INTERNAL MEDICINE

## 2025-03-11 PROCEDURE — 99214 OFFICE O/P EST MOD 30 MIN: CPT | Mod: S$PBB,,, | Performed by: INTERNAL MEDICINE

## 2025-03-11 RX ORDER — AZITHROMYCIN 250 MG/1
TABLET, FILM COATED ORAL
Qty: 6 TABLET | Refills: 0 | Status: SHIPPED | OUTPATIENT
Start: 2025-03-11 | End: 2025-03-15

## 2025-03-11 RX ORDER — HYDROCODONE BITARTRATE AND ACETAMINOPHEN 5; 325 MG/1; MG/1
1 TABLET ORAL EVERY 12 HOURS PRN
Qty: 14 TABLET | Refills: 0 | Status: SHIPPED | OUTPATIENT
Start: 2025-03-11

## 2025-03-11 RX ORDER — PROMETHAZINE HYDROCHLORIDE AND DEXTROMETHORPHAN HYDROBROMIDE 6.25; 15 MG/5ML; MG/5ML
5 SYRUP ORAL EVERY 4 HOURS PRN
Qty: 150 ML | Refills: 0 | Status: SHIPPED | OUTPATIENT
Start: 2025-03-11 | End: 2025-03-21

## 2025-03-13 NOTE — PROGRESS NOTES
Subjective:       Patient ID: Ck Chavez is a 60 y.o. male.    Chief Complaint: Cough, Follow-up (3 month), and Leg Pain (right)    HPI  History of Present Illness    CHIEF COMPLAINT:  Ck presents today for follow up. Pt just came back from trip to [place]    CURRENT SYMPTOMS:  He experiences a tickling and itchy sensation in throat triggering cough without sputum production. He reports sharp, biting pain while sitting that interferes with sleep.    SOCIAL HISTORY:  He recently quit smoking after surgery relapse but uit again, primarily smoking when drinking alcohol and minimally at home.    PENDING RESULTS:  Blood tests, urine tests, and cardiac evaluation were performed yesterday with results expected Monday.      ROS:  General: -fever, -chills, -fatigue, -weight gain, -weight loss  Eyes: -vision changes, -redness, -discharge  ENT: -ear pain, -nasal congestion, -sore throat  Cardiovascular: -chest pain, -palpitations, -lower extremity edema  Respiratory: +cough, -shortness of breath  Gastrointestinal: -abdominal pain, -nausea, -vomiting, -diarrhea, -constipation, -blood in stool  Genitourinary: -dysuria, -hematuria, -frequency  Musculoskeletal: -joint pain, -muscle pain  Skin: -rash, -lesion  Neurological: -headache, -dizziness, -numbness, -tingling  Psychiatric: -anxiety, -depression, +sleep difficulty       Review of Systems    Objective:      Physical Exam  Physical Exam    General: No acute distress. Well-developed. Well-nourished.  Eyes: EOMI. Sclerae anicteric.  HENT: Normocephalic. Atraumatic. Nares patent. Moist oral mucosa.  Ears: Bilateral TMs clear. Bilateral EACs clear.  Cardiovascular: Regular rate. Regular rhythm. No murmurs. No rubs. No gallops. Normal S1, S2.  Respiratory: Normal respiratory effort. Clear to auscultation bilaterally. No rales. No rhonchi. No wheezing.  Abdomen: Soft. Non-tender. Non-distended. Normoactive bowel sounds.  Musculoskeletal: No  obvious deformity.  Extremities: No  lower extremity edema.  Neurological: Alert & oriented x3. No slurred speech. Normal gait.  Psychiatric: Normal mood. Normal affect. Good insight. Good judgment.  Skin: Warm. Dry. No rash.           Assessment:       1. Lumbar disc disease with radiculopathy    2. Hyperlipidemia, unspecified hyperlipidemia type    3. Type 2 diabetes mellitus without complication, without long-term current use of insulin    4. Acute non-recurrent maxillary sinusitis    5. Primary hypertension    6. Need for hepatitis C screening test        Plan:       Lumbar disc disease with radiculopathy  -     HYDROcodone-acetaminophen (NORCO) 5-325 mg per tablet; Take 1 tablet by mouth every 12 (twelve) hours as needed for Pain.  Dispense: 14 tablet; Refill: 0    Hyperlipidemia, unspecified hyperlipidemia type  -     Lipid Panel; Future; Expected date: 03/11/2025    Type 2 diabetes mellitus without complication, without long-term current use of insulin  -     MICROALBUMIN / CREATININE RATIO URINE  -     Hemoglobin A1C; Future; Expected date: 03/11/2025  -     Foot Exam Performed  -     Diabetic Eye Screening Photo; Future    Acute non-recurrent maxillary sinusitis  -     azithromycin (Z-ANDRIY) 250 MG tablet; 2 tabs by mouth day 1, then 1 tab by mouth daily x 4 days  Dispense: 6 tablet; Refill: 0  -     promethazine-dextromethorphan (PROMETHAZINE-DM) 6.25-15 mg/5 mL Syrp; Take 5 mLs by mouth every 4 (four) hours as needed (coughing).  Dispense: 150 mL; Refill: 0    Primary hypertension  Comments:  BP well controlled no change in tx    Need for hepatitis C screening test  -     Hepatitis C Antibody; Future; Expected date: 03/11/2025      Assessment & Plan    IMPRESSION:  - Considered patient's recent smoking cessation and persistent cough  - Monitored blood pressure, which has shown improvement  - Evaluated blood sugar, noting current level of 117 is acceptable while on medication  - Assessed need for pain management leading up to scheduled  surgery  - Reviewed pre-operative labs, urine test, and heart test results    TYPE 2 DIABETES MELLITUS:  - Ordered blood sugar test, result was 117.  - Explained that blood glucose level of 117 is within acceptable range while on medication.  - Discussed that blood sugar of 300-400 would be concerning.  - Continued current diabetes medication as blood sugar is well-controlled at 117.  - Advised the patient to maintain regular blood sugar monitoring.    HYPERTENSION:  - Continued current blood pressure medication, noting improved efficacy.  - Monitored patient's blood pressure, which shows a decreasing trend.  - Encouraged the patient to maintain regular blood pressure checks.    CHRONIC PAIN:  - Prescribed short-term pain medication until surgery date.  - Scheduled follow-up visit on Monday for the rescheduled surgery (originally planned for Thursday).  - Acknowledged the patient's pain when sitting, which affects sleep.  - Instructed the patient to use pain medication as prescribed and report any adverse effects.    NICOTINE DEPENDENCE:  - Advised the patient to continue smoking cessation efforts.  - Discussed the patient's history of quitting and relapsing to smoking, especially when drinking.  - Educated the patient about the health risks associated with smoking and the benefits of quitting.  - Recommend nicotine replacement therapy or other smoking cessation aids if needed.    ALCOHOL ABUSE:  - Discussed the patient's alcohol consumption and its association with increased smoking.  - Advised the patient to reduce alcohol intake and consider abstinence.  - Recommend resources for alcohol abuse management if the patient is interested.    CHRONIC COUGH:  - Prescribed medication for the patient's persistent cough without sputum, described as tickling and itchy.  - Instructed the patient on proper use of the prescribed cough medication.  - Advised the patient to monitor cough symptoms and report any changes or lack  of improvement.  - Recommend follow-up if cough persists despite treatment.    FOLLOW-UP:  - Refilled medication for 3 months.           Medication List with Changes/Refills   New Medications    AZITHROMYCIN (Z-ANDRIY) 250 MG TABLET    2 tabs by mouth day 1, then 1 tab by mouth daily x 4 days    HYDROCODONE-ACETAMINOPHEN (NORCO) 5-325 MG PER TABLET    Take 1 tablet by mouth every 12 (twelve) hours as needed for Pain.    PROMETHAZINE-DEXTROMETHORPHAN (PROMETHAZINE-DM) 6.25-15 MG/5 ML SYRP    Take 5 mLs by mouth every 4 (four) hours as needed (coughing).   Current Medications    AMLODIPINE-BENAZEPRIL (LOTREL) 5-40 MG PER CAPSULE    Take 1 capsule by mouth once daily.    ATORVASTATIN (LIPITOR) 10 MG TABLET    Take 1 tablet (10 mg total) by mouth every evening.    GABAPENTIN (NEURONTIN) 300 MG CAPSULE    Take 1 capsule (300 mg total) by mouth 3 (three) times daily.    METFORMIN (GLUCOPHAGE-XR) 500 MG ER 24HR TABLET    Take 1 tablet (500 mg total) by mouth 2 (two) times daily.    METHOCARBAMOL (ROBAXIN) 750 MG TAB    Take 1 tablet (750 mg total) by mouth 3 (three) times daily.    NAPROXEN (EC NAPROSYN) 500 MG EC TABLET    Take 1 tablet (500 mg total) by mouth 2 (two) times daily with meals.        This note was generated with the assistance of ambient listening technology. Verbal consent was obtained by the patient and accompanying visitor(s) for the recording of patient appointment to facilitate this note. I attest to having reviewed and edited the generated note for accuracy, though some syntax or spelling errors may persist. Please contact the author of this note for any clarification.

## 2025-03-14 ENCOUNTER — PATIENT MESSAGE (OUTPATIENT)
Dept: NEUROSURGERY | Facility: CLINIC | Age: 60
End: 2025-03-14
Payer: MEDICAID

## 2025-03-14 ENCOUNTER — TELEPHONE (OUTPATIENT)
Dept: NEUROSURGERY | Facility: CLINIC | Age: 60
End: 2025-03-14
Payer: MEDICAID

## 2025-03-14 NOTE — TELEPHONE ENCOUNTER
Called Maritza back in regards to the denial letter. I informed her that we have scheduled a peer to peer for Mr. Chavez. Informed her that in the event that the peer to peer is denied, we will proceed with scheduling an appeal for Mr. Chavez. Maritza is intent on proceeding as planned with the surgery. She verbally demonstrated understanding and gratitude.       ----- Message from [employer] sent at 3/13/2025 10:53 AM CDT -----  Regarding: Callback  Contact: Maritza/wife [phone]  Patient's wife Maritza calling requesting a callback from nurse or provider in regards to receiving a denial letter from insurance for upcoming procedure. Please call back as soon as possible.

## 2025-03-14 NOTE — TELEPHONE ENCOUNTER
Called pt's wife, Maritza, to inform her of the instructions regarding Mr. Chavez's surgery. Informed her of the fasting instructions, bathing instructions, and surgery arrival time. I also informed her of her where they should located on the day of surgery. The following was stated.      Adult Surgery Instructions     You have been scheduled for surgery on 3/17/2025 at 4PM.      Pre- op department will call a couple of days before the procedure to advised what medications can be taken the morning of the procedure with a sip of water- [phone]     You will need report to [facility] at 2PM.     Stop taking all herbal medications 14 days prior to surgery     Stop taking aspirin or blood thinners 5-7 days before surgery     Stop smoking ASAP     Refrain from drinking alcoholic beverages for 24 hours prior to surgery     Nothing to eat 8 hours before the surgery start time. So, breakfast should be finished by 8 AM.     Can have clear liquids up to arrival time of 2:00 PM. This includes JELL-O     Bathe in either Hibiclens or Dial soap, from the neck down, the night before surgery and the morning of surgery. Use regular shampoo for your hair     Do not apply perfume, powder, body lotion, or deodorant the morning of surgery     Leave jewelry at home     Wear comfortable clothes     If you have any questions or concerns about your surgery, please call medical assistantRobin, at [phone] or send a message through the patient portal.     ThanksRobin

## 2025-03-17 ENCOUNTER — ANESTHESIA (OUTPATIENT)
Dept: SURGERY | Facility: HOSPITAL | Age: 60
End: 2025-03-17
Payer: MEDICAID

## 2025-03-17 ENCOUNTER — HOSPITAL ENCOUNTER (OUTPATIENT)
Facility: HOSPITAL | Age: 60
Discharge: HOME | End: 2025-03-17
Attending: NEUROLOGICAL SURGERY | Admitting: NEUROLOGICAL SURGERY
Payer: MEDICAID

## 2025-03-17 ENCOUNTER — ANESTHESIA EVENT (OUTPATIENT)
Dept: SURGERY | Facility: HOSPITAL | Age: 60
End: 2025-03-17
Payer: MEDICAID

## 2025-03-17 VITALS
WEIGHT: 138 LBS | RESPIRATION RATE: 16 BRPM | TEMPERATURE: 97 F | OXYGEN SATURATION: 96 % | HEART RATE: 76 BPM | BODY MASS INDEX: 21.66 KG/M2 | SYSTOLIC BLOOD PRESSURE: 176 MMHG | DIASTOLIC BLOOD PRESSURE: 103 MMHG | HEIGHT: 67 IN

## 2025-03-17 DIAGNOSIS — M51.16 LUMBAR DISC DISEASE WITH RADICULOPATHY: Primary | ICD-10-CM

## 2025-03-17 DIAGNOSIS — M54.16 LUMBAR RADICULOPATHY: ICD-10-CM

## 2025-03-17 LAB
POCT GLUCOSE: 106 MG/DL (ref 70–110)
POCT GLUCOSE: 130 MG/DL (ref 70–110)

## 2025-03-17 PROCEDURE — 36415 COLL VENOUS BLD VENIPUNCTURE: CPT | Performed by: NEUROLOGICAL SURGERY

## 2025-03-17 PROCEDURE — 71000033 HC RECOVERY, INTIAL HOUR: Performed by: NEUROLOGICAL SURGERY

## 2025-03-17 PROCEDURE — D9220A PRA ANESTHESIA: Mod: ANES,,, | Performed by: ANESTHESIOLOGY

## 2025-03-17 PROCEDURE — 25000003 PHARM REV CODE 250: Performed by: STUDENT IN AN ORGANIZED HEALTH CARE EDUCATION/TRAINING PROGRAM

## 2025-03-17 PROCEDURE — 37000009 HC ANESTHESIA EA ADD 15 MINS: Performed by: NEUROLOGICAL SURGERY

## 2025-03-17 PROCEDURE — 63600175 PHARM REV CODE 636 W HCPCS: Mod: JZ,TB | Performed by: NEUROLOGICAL SURGERY

## 2025-03-17 PROCEDURE — 63600175 PHARM REV CODE 636 W HCPCS

## 2025-03-17 PROCEDURE — 25000003 PHARM REV CODE 250: Performed by: NEUROLOGICAL SURGERY

## 2025-03-17 PROCEDURE — 71000015 HC POSTOP RECOV 1ST HR: Performed by: NEUROLOGICAL SURGERY

## 2025-03-17 PROCEDURE — 27201423 OPTIME MED/SURG SUP & DEVICES STERILE SUPPLY: Performed by: NEUROLOGICAL SURGERY

## 2025-03-17 PROCEDURE — 63600175 PHARM REV CODE 636 W HCPCS: Performed by: NURSE ANESTHETIST, CERTIFIED REGISTERED

## 2025-03-17 PROCEDURE — 37000008 HC ANESTHESIA 1ST 15 MINUTES: Performed by: NEUROLOGICAL SURGERY

## 2025-03-17 PROCEDURE — 82962 GLUCOSE BLOOD TEST: CPT | Performed by: NEUROLOGICAL SURGERY

## 2025-03-17 PROCEDURE — 36000710: Performed by: NEUROLOGICAL SURGERY

## 2025-03-17 PROCEDURE — 25000003 PHARM REV CODE 250: Performed by: NURSE ANESTHETIST, CERTIFIED REGISTERED

## 2025-03-17 PROCEDURE — 63030 LAMOT DCMPRN NRV RT 1 LMBR: CPT | Mod: RT,,, | Performed by: NEUROLOGICAL SURGERY

## 2025-03-17 PROCEDURE — 71000016 HC POSTOP RECOV ADDL HR: Performed by: NEUROLOGICAL SURGERY

## 2025-03-17 PROCEDURE — D9220A PRA ANESTHESIA: Mod: CRNA,,, | Performed by: NURSE ANESTHETIST, CERTIFIED REGISTERED

## 2025-03-17 PROCEDURE — 63600175 PHARM REV CODE 636 W HCPCS: Performed by: NEUROLOGICAL SURGERY

## 2025-03-17 PROCEDURE — 36000711: Performed by: NEUROLOGICAL SURGERY

## 2025-03-17 RX ORDER — ACETAMINOPHEN 500 MG
1000 TABLET ORAL EVERY 8 HOURS PRN
Qty: 60 TABLET | Refills: 0 | Status: SHIPPED | OUTPATIENT
Start: 2025-03-17

## 2025-03-17 RX ORDER — SODIUM CHLORIDE 9 MG/ML
INJECTION, SOLUTION INTRAVENOUS CONTINUOUS
Status: DISCONTINUED | OUTPATIENT
Start: 2025-03-17 | End: 2025-03-17 | Stop reason: HOSPADM

## 2025-03-17 RX ORDER — DEXMEDETOMIDINE HYDROCHLORIDE 100 UG/ML
INJECTION, SOLUTION INTRAVENOUS
Status: DISCONTINUED | OUTPATIENT
Start: 2025-03-17 | End: 2025-03-17

## 2025-03-17 RX ORDER — ROCURONIUM BROMIDE 10 MG/ML
INJECTION, SOLUTION INTRAVENOUS
Status: DISCONTINUED | OUTPATIENT
Start: 2025-03-17 | End: 2025-03-17

## 2025-03-17 RX ORDER — PHENYLEPHRINE HYDROCHLORIDE 10 MG/ML
INJECTION INTRAVENOUS
Status: DISCONTINUED | OUTPATIENT
Start: 2025-03-17 | End: 2025-03-17

## 2025-03-17 RX ORDER — OXYCODONE HYDROCHLORIDE 5 MG/1
5 TABLET ORAL EVERY 4 HOURS PRN
Qty: 30 TABLET | Refills: 0 | Status: SHIPPED | OUTPATIENT
Start: 2025-03-17

## 2025-03-17 RX ORDER — POLYETHYLENE GLYCOL 3350 17 G/17G
17 POWDER, FOR SOLUTION ORAL 2 TIMES DAILY PRN
Qty: 510 G | Refills: 0 | Status: SHIPPED | OUTPATIENT
Start: 2025-03-17

## 2025-03-17 RX ORDER — FENTANYL CITRATE 50 UG/ML
INJECTION, SOLUTION INTRAMUSCULAR; INTRAVENOUS
Status: DISCONTINUED | OUTPATIENT
Start: 2025-03-17 | End: 2025-03-17

## 2025-03-17 RX ORDER — VASOPRESSIN 20 [USP'U]/ML
INJECTION, SOLUTION INTRAMUSCULAR; SUBCUTANEOUS
Status: DISCONTINUED | OUTPATIENT
Start: 2025-03-17 | End: 2025-03-17

## 2025-03-17 RX ORDER — LIDOCAINE HYDROCHLORIDE 20 MG/ML
INJECTION INTRAVENOUS
Status: DISCONTINUED | OUTPATIENT
Start: 2025-03-17 | End: 2025-03-17

## 2025-03-17 RX ORDER — FENTANYL CITRATE 50 UG/ML
25 INJECTION, SOLUTION INTRAMUSCULAR; INTRAVENOUS EVERY 5 MIN PRN
Refills: 0 | Status: COMPLETED | OUTPATIENT
Start: 2025-03-17 | End: 2025-03-17

## 2025-03-17 RX ORDER — PROCHLORPERAZINE EDISYLATE 5 MG/ML
5 INJECTION INTRAMUSCULAR; INTRAVENOUS EVERY 30 MIN PRN
Status: DISCONTINUED | OUTPATIENT
Start: 2025-03-17 | End: 2025-03-17 | Stop reason: HOSPADM

## 2025-03-17 RX ORDER — PROPOFOL 10 MG/ML
VIAL (ML) INTRAVENOUS
Status: DISCONTINUED | OUTPATIENT
Start: 2025-03-17 | End: 2025-03-17

## 2025-03-17 RX ORDER — METHOCARBAMOL 750 MG/1
750 TABLET, FILM COATED ORAL 4 TIMES DAILY PRN
Qty: 60 TABLET | Refills: 1 | Status: SHIPPED | OUTPATIENT
Start: 2025-03-17

## 2025-03-17 RX ORDER — LIDOCAINE HYDROCHLORIDE 10 MG/ML
1 INJECTION, SOLUTION EPIDURAL; INFILTRATION; INTRACAUDAL; PERINEURAL ONCE AS NEEDED
Status: COMPLETED | OUTPATIENT
Start: 2025-03-17 | End: 2025-03-17

## 2025-03-17 RX ORDER — MUPIROCIN 20 MG/G
OINTMENT TOPICAL
Status: DISCONTINUED | OUTPATIENT
Start: 2025-03-17 | End: 2025-03-17 | Stop reason: HOSPADM

## 2025-03-17 RX ORDER — HYDROMORPHONE HYDROCHLORIDE 1 MG/ML
0.2 INJECTION, SOLUTION INTRAMUSCULAR; INTRAVENOUS; SUBCUTANEOUS EVERY 5 MIN PRN
Refills: 0 | Status: COMPLETED | OUTPATIENT
Start: 2025-03-17 | End: 2025-03-17

## 2025-03-17 RX ORDER — HALOPERIDOL LACTATE 5 MG/ML
0.5 INJECTION, SOLUTION INTRAMUSCULAR EVERY 10 MIN PRN
Status: DISCONTINUED | OUTPATIENT
Start: 2025-03-17 | End: 2025-03-17 | Stop reason: HOSPADM

## 2025-03-17 RX ORDER — METHYLPREDNISOLONE ACETATE 40 MG/ML
INJECTION, SUSPENSION INTRA-ARTICULAR; INTRALESIONAL; INTRAMUSCULAR; SOFT TISSUE
Status: DISCONTINUED | OUTPATIENT
Start: 2025-03-17 | End: 2025-03-17 | Stop reason: HOSPADM

## 2025-03-17 RX ORDER — MUPIROCIN 20 MG/G
1 OINTMENT TOPICAL 2 TIMES DAILY
Status: DISCONTINUED | OUTPATIENT
Start: 2025-03-17 | End: 2025-03-17 | Stop reason: HOSPADM

## 2025-03-17 RX ORDER — MIDAZOLAM HYDROCHLORIDE 1 MG/ML
INJECTION INTRAMUSCULAR; INTRAVENOUS
Status: DISCONTINUED | OUTPATIENT
Start: 2025-03-17 | End: 2025-03-17

## 2025-03-17 RX ORDER — LABETALOL HCL 20 MG/4 ML
5 SYRINGE (ML) INTRAVENOUS ONCE
Status: COMPLETED | OUTPATIENT
Start: 2025-03-17 | End: 2025-03-17

## 2025-03-17 RX ADMIN — HYDROMORPHONE HYDROCHLORIDE 0.2 MG: 1 INJECTION, SOLUTION INTRAMUSCULAR; INTRAVENOUS; SUBCUTANEOUS at 07:03

## 2025-03-17 RX ADMIN — PHENYLEPHRINE HYDROCHLORIDE 100 MCG: 10 INJECTION INTRAVENOUS at 04:03

## 2025-03-17 RX ADMIN — VASOPRESSIN 1 UNITS: 20 INJECTION INTRAVENOUS at 06:03

## 2025-03-17 RX ADMIN — MIDAZOLAM HYDROCHLORIDE 2 MG: 2 INJECTION, SOLUTION INTRAMUSCULAR; INTRAVENOUS at 04:03

## 2025-03-17 RX ADMIN — LIDOCAINE HYDROCHLORIDE 10 MG: 10 INJECTION, SOLUTION EPIDURAL; INFILTRATION; INTRACAUDAL; PERINEURAL at 02:03

## 2025-03-17 RX ADMIN — VASOPRESSIN 1 UNITS: 20 INJECTION INTRAVENOUS at 05:03

## 2025-03-17 RX ADMIN — DEXMEDETOMIDINE 8 MCG: 100 INJECTION, SOLUTION, CONCENTRATE INTRAVENOUS at 05:03

## 2025-03-17 RX ADMIN — PHENYLEPHRINE HYDROCHLORIDE 200 MCG: 10 INJECTION INTRAVENOUS at 04:03

## 2025-03-17 RX ADMIN — PHENYLEPHRINE HYDROCHLORIDE 300 MCG: 10 INJECTION INTRAVENOUS at 05:03

## 2025-03-17 RX ADMIN — SODIUM CHLORIDE, SODIUM GLUCONATE, SODIUM ACETATE, POTASSIUM CHLORIDE, MAGNESIUM CHLORIDE, SODIUM PHOSPHATE, DIBASIC, AND POTASSIUM PHOSPHATE: .53; .5; .37; .037; .03; .012; .00082 INJECTION, SOLUTION INTRAVENOUS at 05:03

## 2025-03-17 RX ADMIN — PROPOFOL 150 MG: 10 INJECTION, EMULSION INTRAVENOUS at 04:03

## 2025-03-17 RX ADMIN — PHENYLEPHRINE HYDROCHLORIDE 100 MCG: 10 INJECTION INTRAVENOUS at 05:03

## 2025-03-17 RX ADMIN — FENTANYL CITRATE 50 MCG: 50 INJECTION, SOLUTION INTRAMUSCULAR; INTRAVENOUS at 04:03

## 2025-03-17 RX ADMIN — FENTANYL CITRATE 25 MCG: 50 INJECTION INTRAMUSCULAR; INTRAVENOUS at 06:03

## 2025-03-17 RX ADMIN — FENTANYL CITRATE 25 MCG: 50 INJECTION INTRAMUSCULAR; INTRAVENOUS at 07:03

## 2025-03-17 RX ADMIN — MUPIROCIN: 20 OINTMENT TOPICAL at 02:03

## 2025-03-17 RX ADMIN — ROCURONIUM BROMIDE 20 MG: 10 INJECTION, SOLUTION INTRAVENOUS at 05:03

## 2025-03-17 RX ADMIN — LABETALOL HYDROCHLORIDE 5 MG: 5 INJECTION, SOLUTION INTRAVENOUS at 08:03

## 2025-03-17 RX ADMIN — SUGAMMADEX 200 MG: 100 INJECTION, SOLUTION INTRAVENOUS at 06:03

## 2025-03-17 RX ADMIN — PHENYLEPHRINE HYDROCHLORIDE 200 MCG: 10 INJECTION INTRAVENOUS at 05:03

## 2025-03-17 RX ADMIN — DEXTROSE 2 G: 50 INJECTION, SOLUTION INTRAVENOUS at 05:03

## 2025-03-17 RX ADMIN — HYDROMORPHONE HYDROCHLORIDE 0.2 MG: 1 INJECTION, SOLUTION INTRAMUSCULAR; INTRAVENOUS; SUBCUTANEOUS at 08:03

## 2025-03-17 RX ADMIN — DEXMEDETOMIDINE 8 MCG: 100 INJECTION, SOLUTION, CONCENTRATE INTRAVENOUS at 04:03

## 2025-03-17 RX ADMIN — LIDOCAINE HYDROCHLORIDE 100 MG: 20 INJECTION INTRAVENOUS at 04:03

## 2025-03-17 RX ADMIN — ROCURONIUM BROMIDE 50 MG: 10 INJECTION, SOLUTION INTRAVENOUS at 04:03

## 2025-03-17 RX ADMIN — DEXMEDETOMIDINE 8 MCG: 100 INJECTION, SOLUTION, CONCENTRATE INTRAVENOUS at 06:03

## 2025-03-17 RX ADMIN — SODIUM CHLORIDE: 0.9 INJECTION, SOLUTION INTRAVENOUS at 04:03

## 2025-03-17 RX ADMIN — SODIUM CHLORIDE: 9 INJECTION, SOLUTION INTRAVENOUS at 02:03

## 2025-03-17 NOTE — ANESTHESIA PREPROCEDURE EVALUATION
03/17/2025  Ck Chavez is a 60 y.o., ddoing well today. Adequately NPO denies severe pain.      Irvin Sims MD   Physician  Specialty: Neurosurgery     Progress Notes     Signed     Encounter Date: 2/26/2025  Creation Time: 2/26/2025  7:36 AM       Neurosurgery  Established Patient     SCRIBE #1 NOTE: I, Young Denton, am scribing for, and in the presence of,  Irvin Sims. I have scribed the entire note.         SUBJECTIVE:      History of Present Illness:  Patient is a 59 year old male who we last saw at last evaluation on 1/14/2025. Patient has multiple level degenerative disc disease at L4-L5 L5-S1. We wanted to treat with conservative management. He is here after updated MRI scan. Today the patient presents with pain in the RLE radiating down his leg, as well as experiencing numbing in the top of his foot, with an onset of about a year. He also reports the left side hurts occasionally, though not as prominent as his right side.          Pre-op Assessment          Review of Systems  Anesthesia Hx:  No problems with previous Anesthesia             Denies Family Hx of Anesthesia complications.    Denies Personal Hx of Anesthesia complications.                    Social:  Smoker, Social Alcohol Use WIFE STATED HAS STOPPED SMOKING FOR 2 WEEKS AS OF 2/27/2025      Hematology/Oncology:  Hematology Normal   Oncology Normal                                   EENT/Dental:  EENT/Dental Normal           Cardiovascular:            Denies Angina.     hyperlipidemia       Functional Capacity 3.5 METS, ABLE TOP CLIMB 2 FLIGHTS OF STAIRS SLOWLY                   Hypertension  , Recent typical clinic B/P of 148/74       Pulmonary:  Pulmonary Normal      Denies Shortness of breath.  Denies Recent URI.                 Renal/:  Renal/ Normal                 Hepatic/GI:  Hepatic/GI Normal                     Musculoskeletal:   Musculoskeletal General/Symptoms:  Functional capacity is ambulatory without assistance.          Lumbar Spine Disorders, Lumbar Disc Disease, Radiculopathy L-DDD, LUMBAR SPINAL STENOSIS, HNP- LUMBAR  Neurological:   Neuro Symptoms of pain OF BACK AND RLE Denies Pain                                 Endocrine:   Diabetes, Type 2 Diabetes   , controlled by oral hypoglycemics. Typical AM glucose range: DOES NOT CHECK AT HOME                  Psych:  Psychiatric Normal                  Physical Exam  General: Well nourished    Airway:  Mallampati: II   Mouth Opening: Normal  Tongue: Normal  Neck ROM: Normal ROM    Dental:  Intact    Chest/Lungs:  Clear to auscultation          Anesthesia Assessment: Preoperative EQUATION    Planned Procedure: Procedure(s) (LRB):  MIS DISCECTOMY, SPINE, LUMBAR L4-5 (Right)  Requested Anesthesia Type:General  Surgeon: Irvin Sims MD  Service: Neurosurgery  Known or anticipated Date of Surgery:3/13/2025,Date change 3/17/2025    Surgeon notes: reviewed    Electronic QUestionnaire Assessment completed via nurse interview with patient.        Triage considerations:     The patient has no apparent active cardiac condition (No unstable coronary Syndrome such as severe unstable angina or recent [<1 month] myocardial infarction, decompensated CHF, severe valvular   disease or significant arrhythmia)    Previous anesthesia records:MAC and No problems  7/10/2024  COLONOSCOPY   Airway:  Mallampati: I   Mouth Opening: Normal  TM Distance: Normal  Tongue: Normal  Neck ROM: Normal ROM  Airway Placement Date: 07/10/24 Placement Time: 0717 Airway Device: Nasal Cannula     Last PCP note: 3-6 months ago , within [facility]   Subspecialty notes: Neurosurgery    Other important co-morbidities: DM2, HLD, HTN, Smoker, and LUMBAR SPINAL STENOSIS       Tests already available:  Available tests,  within 1 month , within [facility] .   2/26/2025 MRI LUMBAR SPINE W/O CONTRAST          Instructions  given. (See in Nurse's note)    Optimization:  Anesthesia Preop Clinic Assessment  - not Indicated for this surgery      Plan:    Testing:  BMP, EKG, Hematology Profile, PT/INR, PTT, T&S, and UA         Patient  has previously scheduled Medical Appointment:3/11 Dr Hannah    Navigation: Tests Scheduled. TBD             Consults scheduled.TBD             Results will be tracked by Preop Clinic.  3/10 Labs, UA, and EKG resulted and noted by Dr. Alda Cuba.  Nazia Rosenthal RN BSN                  Anesthesia Plan  Type of Anesthesia, risks & benefits discussed:    Anesthesia Type: Gen ETT  Intra-op Monitoring Plan: Standard ASA Monitors  Post Op Pain Control Plan: multimodal analgesia  Induction:  IV  Airway Plan: Direct  Informed Consent: Informed consent signed with the Patient and all parties understand the risks and agree with anesthesia plan.  All questions answered.   ASA Score: 3    Ready For Surgery From Anesthesia Perspective.     .

## 2025-03-17 NOTE — TRANSFER OF CARE
"Anesthesia Transfer of Care Note    Patient: Ck Chavez    Procedure(s) Performed: Procedure(s) (LRB):  MIS DISCECTOMY, SPINE, LUMBAR L4-5 (Right)    Patient location: PACU    Anesthesia Type: general    Transport from OR: Transported from OR on 6-10 L/min O2 by face mask with adequate spontaneous ventilation    Post pain: adequate analgesia    Post assessment: no apparent anesthetic complications and tolerated procedure well    Post vital signs: stable    Level of consciousness: awake and alert    Nausea/Vomiting: no nausea/vomiting    Complications: none    Transfer of care protocol was followed    Last vitals: Visit Vitals  BP (!) 214/112   Pulse 70   Temp 36.3 °C (97.3 °F) (Temporal)   Resp 16   Ht 5' 7" (1.702 m)   Wt 62.6 kg (138 lb)   SpO2 99%   BMI 21.61 kg/m²     "

## 2025-03-17 NOTE — BRIEF OP NOTE
[facility] - Surgery ([facility])  Brief Operative Note    Surgery Date: 3/17/2025     Surgeons and Role:     * Irvin Sims MD - Primary     * Patricia Lawrence MD - Resident - Assisting        Pre-op Diagnosis:  HNP (herniated nucleus pulposus), lumbar [M51.26]  Spinal stenosis of lumbar region, unspecified whether neurogenic claudication present [M48.061]    Post-op Diagnosis:  Post-Op Diagnosis Codes:     * HNP (herniated nucleus pulposus), lumbar [M51.26]     * Spinal stenosis of lumbar region, unspecified whether neurogenic claudication present [M48.061]    Procedure(s) (LRB):  MIS DISCECTOMY, SPINE, LUMBAR L4-5 (Right)    Anesthesia: General    Operative Findings: L4/L5 MIS hemilaminectomy and discectomy right sided approach    Estimated Blood Loss: * No values recorded between 3/17/2025  4:21 PM and 3/17/2025  6:23 PM *         Specimens:   Specimen (24h ago, onward)      None              Discharge Note    OUTCOME: Patient tolerated treatment/procedure well without complication and is now ready for discharge.    DISPOSITION: Home or Self Care    FINAL DIAGNOSIS:  <principal problem not specified>    FOLLOWUP: In clinic    DISCHARGE INSTRUCTIONS:  No discharge procedures on file.

## 2025-03-17 NOTE — PROGRESS NOTES
/103. Pt remains asymptomatic at this time. Updated patient and family on delay of surgery. Bathroom and warm blanket offered. Call button in reach. Will cont to monitor.

## 2025-03-17 NOTE — PROGRESS NOTES
Dr. Knox notified of patients elevated B.P. Patient has his BP medication with him. O.K. for patient to take his home med. Will cont to monitor.

## 2025-03-17 NOTE — DISCHARGE INSTRUCTIONS
--Patient stable for discharge to home. All questions/concerns addressed and answered.    --Please take prescriptions as detailed in medication list. Take pain medications only as needed. Make sure to take a stool softener while taking narcotic pain medications.     --Please followup with neurosurgery clinic in 2 weeks with no imaging; to be arranged by Neurosurgery Clinic     --Continued therapy in home setting as needed weekly    --Please call immediately for any new onset nausea/vomiting/fever/chills, wound breakdown, numbness/tingling/weakness      Post op instructions:  -No driving while on narcotic pain medications  -No excessive bending, twisting or lifting >10 lbs until clinic follow up  -If you were given a brace for spine surgery, please remove to shower, may remove while in bed, no driving, and must be worn for 6 weeks when out of bed.       Wound Care Instructions:  -If you have any dressings at discharge, please remove 48 hours after the surgery.  -You have dissolvable sutures and glue on your incision  -You may shower daily but do not soak or submerge wound in water. Pat incision dry, do not rub.  -Keep all incisions clean, dry, and open to air.  -Do not apply creams or ointments to the wound.  -If you have any leakage or drainage from your incision or any worsening redness around the edges, or if it opens up, please call the neurosurgery clinic.

## 2025-03-18 ENCOUNTER — PATIENT OUTREACH (OUTPATIENT)
Dept: ADMINISTRATIVE | Facility: HOSPITAL | Age: 60
End: 2025-03-18
Payer: MEDICAID

## 2025-03-18 NOTE — PROGRESS NOTES
Health Maintenance Due   Topic Date Due    Hepatitis C Screening  Never done    Diabetes Urine Screening  Never done    RSV Vaccine (Age 60+ and Pregnant patients) (1 - Risk 60-74 years 1-dose series) Never done    Hemoglobin A1c  04/21/2024    Lipid Panel  10/21/2024    Diabetic Eye Exam  11/20/2024     Immunizations - reviewed and updated   Care Everywhere - triggered   Care Teams - updated   Outreach - A1C gap list reviewed. Diabetic labs and eye exam due. Patient is currently 1 day post op from spinal surgery. Portal message sent in regards to scheduling

## 2025-03-18 NOTE — ANESTHESIA RELEASE NOTE
"Anesthesia Release from PACU Note    Patient: Ck Chavez    Procedure(s) Performed: Procedure(s) (LRB):  MIS DISCECTOMY, SPINE, LUMBAR L4-5 (Right)    Anesthesia type: general    Post pain: Adequate analgesia    Post assessment: no apparent anesthetic complications    Last Vitals: Visit Vitals  BP (!) 179/101   Pulse 78   Temp 36.4 °C (97.5 °F) (Temporal)   Resp 17   Ht 5' 7" (1.702 m)   Wt 62.6 kg (138 lb)   SpO2 97%   BMI 21.61 kg/m²       Post vital signs: stable    Level of consciousness: awake, alert , and oriented    Nausea/Vomiting: no nausea/no vomiting    Complications: none    Airway Patency: patent    Respiratory: unassisted    Cardiovascular: stable and blood pressure at baseline    Hydration: euvolemic  "

## 2025-03-19 NOTE — ANESTHESIA POSTPROCEDURE EVALUATION
Anesthesia Post Evaluation    Patient: Ck Chavez    Procedure(s) Performed: Procedure(s) (LRB):  MIS DISCECTOMY, SPINE, LUMBAR L4-5 (Right)    [facility] Anesthesia Post Op Evaluation      Vitals Value Taken Time   /103 03/17/25 20:40   Temp 36.3 °C (97.3 °F) 03/17/25 20:40   Pulse 75 03/17/25 20:40   Resp 18 03/17/25 20:40   SpO2 96 % 03/17/25 20:40   Vitals shown include unfiled device data.      Event Time   Out of Recovery 19:00:00         Pain/Misty Score: No data recorded

## 2025-03-19 NOTE — ANESTHESIA POSTPROCEDURE EVALUATION
Anesthesia Post Evaluation    Patient: Ck Chavez    Procedure(s) Performed: Procedure(s) (LRB):  MIS DISCECTOMY, SPINE, LUMBAR L4-5 (Right)    Final Anesthesia Type: general      Patient location during evaluation: PACU  Patient participation: Yes- Able to Participate  Level of consciousness: awake and alert  Post-procedure vital signs: reviewed and stable  Pain management: adequate  Airway patency: patent    PONV status at discharge: No PONV  Anesthetic complications: no      Cardiovascular status: blood pressure returned to baseline  Respiratory status: unassisted  Hydration status: euvolemic  Follow-up not needed.              Vitals Value Taken Time   /103 03/17/25 20:40   Temp 36.3 °C (97.3 °F) 03/17/25 20:40   Pulse 75 03/17/25 20:40   Resp 18 03/17/25 20:40   SpO2 96 % 03/17/25 20:40   Vitals shown include unfiled device data.      Event Time   Out of Recovery 19:00:00         Pain/Misty Score: No data recorded

## 2025-03-22 NOTE — OP NOTE
[facility] - Surgery ([facility])  Neurosurgery  Operative Note    OP Note      Date of Procedure: 3/17/2025       Pre-Operative Diagnosis: HNP (herniated nucleus pulposus), lumbar [M51.26]  Spinal stenosis of lumbar region, unspecified whether neurogenic claudication present [M48.061]    Post-Operative Diagnosis: Post-Op Diagnosis Codes:     * HNP (herniated nucleus pulposus), lumbar [M51.26]     * Spinal stenosis of lumbar region, unspecified whether neurogenic claudication present [M48.061]    Anesthesia: General    Procedures performed:  Right minimally invasive L4-5 hemilaminotomy with medial facetectomy for microdiskectomy and foraminotomy with microsurgical techniques     Surgeon: Irvin Sims MD    Assistant:: Patricia Lawrence MD    Indication for Procedure:  This is a 60-year-old male with intractable right-sided radiculopathy.  Was found to have L4-5 disc herniation with compression on descending nerve root.  Patient failed conservative management    Operative Note:  Patient was anesthetized intubated by anesthesia.  Was placed in the prone position.  Back was prepped and draped in sterile fashion.  X-ray was used to localize the appropriate level.  A small paramedian skin incision was made.  We opened down of the fascia.  We dilated up to a 18 mm 6. Tube.  Parted over the inferior aspect of the right L4 lamina.  We brought in the microscope under microsurgical technique we removed the soft tissue then drill the miracle lamina on the right side then drilled across the contralateral side.  We took down ligamentum flavum and then did a medial facetectomy.  We found the descending nerve root we are able to pull the the medially.  We identified the annulus and disc fragment below it.  Under microsurgical technique we we cut into annulus.  We then performed a micro diskectomy using combination of pituitaries Kerrisons and downgoing curettes.  Once were happy with the diskectomy and decompression we will follow up with  the nerve root from a foraminotomy.  Once were happy with the central and lateral decompression irrigated out the wound placed some FloSeal.  Placed some Depo-Medrol.  We had with the metrics tube.  Closed the wound in layers.  A sterile dressing was put in place.  Patient was flipped back to supine position extubated brought to recovery room without any problems or complication.    EBL:  Minimal  Specimen Sent:  None

## 2025-03-31 ENCOUNTER — TELEPHONE (OUTPATIENT)
Dept: NEUROSURGERY | Facility: CLINIC | Age: 60
End: 2025-03-31
Payer: MEDICAID

## 2025-03-31 NOTE — TELEPHONE ENCOUNTER
Called and spoke to pt's spouse who states that pt would like to r/s his appt d/t weather. Offered a VV instead but she states that pt does not know how to work that. Confirmed r/s appt to:    Future Appointments   Date Time Provider Department Center   4/7/2025 11:30 AM Jessica Sanchez RN [facility] NEUROS [facility]   4/22/2025  9:45 AM Irvin Sims MD [address]       ----- Message from Dena sent at 3/31/2025  9:08 AM CDT -----  Regarding: Reschedule Appointment  Contact: [phone]  Calling to reschedule post op appointment due to weather. Please contact patient as soon as possible.

## 2025-04-07 ENCOUNTER — CLINICAL SUPPORT (OUTPATIENT)
Dept: NEUROSURGERY | Facility: CLINIC | Age: 60
End: 2025-04-07
Payer: MEDICAID

## 2025-04-07 DIAGNOSIS — Z98.890 S/P DISCECTOMY: Primary | ICD-10-CM

## 2025-04-07 NOTE — PROGRESS NOTES
CC: 2-week post op wound check      HPI:  Patient seen in clinic  for 2 week post op s/p L4-5 microdiscectomy with Dr. Sims 03/13/2025. See op note below:        Irvin Sims MD   Physician  Neurosurgery     Op Note     Signed     Creation Time: 3/22/2025  1:51 PM       [facility] - Surgery ([facility])  Neurosurgery  Operative Note     OP Note      Date of Procedure: 3/17/2025         Pre-Operative Diagnosis: HNP (herniated nucleus pulposus), lumbar [M51.26]  Spinal stenosis of lumbar region, unspecified whether neurogenic claudication present [M48.061]     Post-Operative Diagnosis: Post-Op Diagnosis Codes:     * HNP (herniated nucleus pulposus), lumbar [M51.26]     * Spinal stenosis of lumbar region, unspecified whether neurogenic claudication present [M48.061]     Anesthesia: General     Procedures performed:  Right minimally invasive L4-5 hemilaminotomy with medial facetectomy for microdiskectomy and foraminotomy with microsurgical techniques      Surgeon: Irvin Sims MD     Assistant:: Patricia Lawrence MD     Indication for Procedure:  This is a 60-year-old male with intractable right-sided radiculopathy.  Was found to have L4-5 disc herniation with compression on descending nerve root.  Patient failed conservative management     Operative Note:  Patient was anesthetized intubated by anesthesia.  Was placed in the prone position.  Back was prepped and draped in sterile fashion.  X-ray was used to localize the appropriate level.  A small paramedian skin incision was made.  We opened down of the fascia.  We dilated up to a 18 mm 6. Tube.  Parted over the inferior aspect of the right L4 lamina.  We brought in the microscope under microsurgical technique we removed the soft tissue then drill the miracle lamina on the right side then drilled across the contralateral side.  We took down ligamentum flavum and then did a medial facetectomy.  We found the descending nerve root we are able to pull the the medially.  We  identified the annulus and disc fragment below it.  Under microsurgical technique we we cut into annulus.  We then performed a micro diskectomy using combination of pituitaries Kerrisons and downgoing curettes.  Once were happy with the diskectomy and decompression we will follow up with the nerve root from a foraminotomy.  Once were happy with the central and lateral decompression irrigated out the wound placed some FloSeal.  Placed some Depo-Medrol.  We had with the metrics tube.  Closed the wound in layers.  A sterile dressing was put in place.  Patient was flipped back to supine position extubated brought to recovery room without any problems or complication.       Patient walked in the room ambulating without difficulty. States his pre-op right great toe numbness is 50% better, improving. Still has some right leg radicular pain that is improving.     Lumbar incision assessed, dissolvable sutures used for closure, no redness, swelling, or drainage, edges well approximated.       Patient was instructed as follows:   Discontinue Bacitracin after tonight.  May shower normally but pat dry after shower.  Do not submerge wound in bath tub or go swimming until released by the physician  Keep incision clean, dry and open to air as much as possible.  Patient encouraged to walk as much as possible but advised to walk with family member or friend and rest as necessary.  No lifting >10lbs.  Avoid bending and twisting the area of your surgery more than 45 degrees from neutral position in any direction.      Patient verbalized understanding of all instructions.    All questions were answered. Patient will follow up with Dr. Sims 04/22/2022.  Patient was encouraged to call clinic with any future concerns prior to follow up appt. If any worsening symptoms, patient should report to ED.       Jessica Sanchez, MSN, BSN, RN  Neurosurgery Nurse Navigator

## 2025-04-15 ENCOUNTER — CLINICAL SUPPORT (OUTPATIENT)
Dept: REHABILITATION | Facility: HOSPITAL | Age: 60
End: 2025-04-15
Attending: NEUROLOGICAL SURGERY
Payer: MEDICAID

## 2025-04-15 DIAGNOSIS — Z98.890 S/P DISCECTOMY: ICD-10-CM

## 2025-04-15 DIAGNOSIS — M53.86 DECREASED ROM OF LUMBAR SPINE: ICD-10-CM

## 2025-04-15 DIAGNOSIS — Z74.09 IMPAIRED FUNCTIONAL MOBILITY, BALANCE, AND ENDURANCE: ICD-10-CM

## 2025-04-15 DIAGNOSIS — M54.50 LUMBAR PAIN: Primary | ICD-10-CM

## 2025-04-15 PROCEDURE — 97161 PT EVAL LOW COMPLEX 20 MIN: CPT | Mod: PO

## 2025-04-22 ENCOUNTER — PATIENT MESSAGE (OUTPATIENT)
Dept: NEUROSURGERY | Facility: CLINIC | Age: 60
End: 2025-04-22

## 2025-04-22 ENCOUNTER — PATIENT MESSAGE (OUTPATIENT)
Dept: ADMINISTRATIVE | Facility: HOSPITAL | Age: 60
End: 2025-04-22
Payer: MEDICAID

## 2025-04-22 ENCOUNTER — OFFICE VISIT (OUTPATIENT)
Dept: NEUROSURGERY | Facility: CLINIC | Age: 60
End: 2025-04-22
Payer: MEDICAID

## 2025-04-22 VITALS — TEMPERATURE: 96 F

## 2025-04-22 DIAGNOSIS — M51.26 HNP (HERNIATED NUCLEUS PULPOSUS), LUMBAR: ICD-10-CM

## 2025-04-22 DIAGNOSIS — Z98.890 S/P DISCECTOMY: Primary | ICD-10-CM

## 2025-04-22 DIAGNOSIS — M48.061 SPINAL STENOSIS OF LUMBAR REGION, UNSPECIFIED WHETHER NEUROGENIC CLAUDICATION PRESENT: ICD-10-CM

## 2025-04-22 PROCEDURE — 99999 PR PBB SHADOW E&M-EST. PATIENT-LVL II: CPT | Mod: PBBFAC,,, | Performed by: NEUROLOGICAL SURGERY

## 2025-04-22 PROCEDURE — 99212 OFFICE O/P EST SF 10 MIN: CPT | Mod: PBBFAC | Performed by: NEUROLOGICAL SURGERY

## 2025-04-22 NOTE — PROGRESS NOTES
Neurosurgery  Established Patient    SUBJECTIVE:     History of Present Illness    Patient presents today for follow up after lumbar microdiscectomy and decompression He reports significant improvement in right-sided radiculopathy with foot numbness improved by approximately 50%. 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      ROS:  Neurological: +numbness, +nerve pain, +decreased sensation in extremities           Review of patient's allergies indicates:  No Known Allergies    Current Medications[1]    Past Medical History:   Diagnosis Date    Diabetes mellitus     Hypertension      Past Surgical History:   Procedure Laterality Date    COLONOSCOPY N/A 7/10/2024    Procedure: COLONOSCOPY;  Surgeon: Blue Tristan MD;  Location: [facility];  Service: Endoscopy;  Laterality: N/A;    LUMBAR DISCECTOMY Right 3/17/2025    Procedure: MIS DISCECTOMY, SPINE, LUMBAR L4-5;  Surgeon: Irvin Sims MD;  Location: [facility] OR [address] [place] [address];  Service: Neurosurgery;  Laterality: Right;     Family History    None       Social History     Socioeconomic History    Marital status: Single   Tobacco Use    Smoking status: Former     Current packs/day: 1.00     Types: Cigarettes     Passive exposure: Current    Smokeless tobacco: Never    Tobacco comments:     Quit smoke since recent surgery   Substance and Sexual Activity    Alcohol use: Yes     Alcohol/week: 3.0 standard drinks of alcohol     Types: 3 Cans of beer per week     Comment: 2-3 Weekly    Drug use: Not Currently     Social Drivers of Health     Financial Resource Strain: Medium Risk (3/9/2025)    Overall Financial Resource Strain (CARDIA)     Difficulty of Paying Living Expenses: Somewhat hard   Food Insecurity: Food Insecurity Present (3/9/2025)    Hunger Vital Sign     Worried About Running Out of Food in the Last Year: Sometimes true     Ran Out of Food in the Last Year: Sometimes true   Transportation Needs: No Transportation Needs (3/9/2025)    MAYA  - Transportation     Lack of Transportation (Medical): No     Lack of Transportation (Non-Medical): No   Physical Activity: Insufficiently Active (3/9/2025)    Exercise Vital Sign     Days of Exercise per Week: 2 days     Minutes of Exercise per Session: 20 min   Stress: No Stress Concern Present (3/9/2025)    [employer] - Occupational Stress Questionnaire     Feeling of Stress : Only a little   Housing Stability: Unknown (3/9/2025)    Housing Stability Vital Sign     Unable to Pay for Housing in the Last Year: Patient declined     Number of Times Moved in the Last Year: 1     Homeless in the Last Year: No           OBJECTIVE:     Vital Signs  Temp: (!) 95.7 °F (35.4 °C)  Pain Score:   5  There is no height or weight on file to calculate BMI.    Physical Exam                    Diagnostic Results:  Narrative & Impression  EXAMINATION:  MRI LUMBAR SPINE WITHOUT CONTRAST     CLINICAL HISTORY:  Low back pain, symptoms persist with > 6wks conservative treatment; Dorsalgia, unspecified     TECHNIQUE:  Multiplanar, multisequence MR images were acquired from the thoracolumbar junction to the sacrum without contrast.     COMPARISON:  09/03/2024     FINDINGS:  Alignment: Straightening of lordosis.     Vertebrae: No fracture or marrow infiltrative process.     Discs: Multilevel disc desiccation.  Disc heights are maintained.  Annular fissures noted at L3-L4, L4-L5 and L5-S1.  No evidence for discitis.     Cord: Conus terminates at L2 and appears unremarkable.  Cauda equina appears unremarkable.     Degenerative findings:     T12-L1: No spinal canal stenosis or neuroforaminal narrowing.     L1-L2: No spinal canal stenosis or neuroforaminal narrowing.     L2-L3: Circumferential disc bulge mild facet arthropathy.  No spinal canal stenosis or neural foraminal narrowing.     L3-L4: Circumferential disc bulge and mild facet arthropathy result in mild spinal canal stenosis.     L4-L5: Circumferential  disc bulge and mild facet arthropathy result in moderate effacement of the lateral recesses and moderate left, mild right neural foraminal narrowing.     L5-S1: Circumferential disc bulge and mild facet arthropathy result in moderate left, mild right neural foraminal narrowing.     Paraspinal muscles & soft tissues: There is a 1.1 cm T2 hyperintense lesion in the right adrenal gland, unchanged from prior study.     Impression:     1. Multilevel degenerative changes of the lumbar spine detailed above.  Moderate neural foraminal narrowing re-identified at L4-S1.  2. Stable appearance of T2 hyperintense right adrenal nodule.       ASSESSMENT/PLAN:     Assessment & Plan    M51.17 Intervertebral disc disorders with radiculopathy, lumbosacral region  M96.1 Postlaminectomy syndrome, not elsewhere classified    IMPRESSION:  - Post-lumbar microdiscectomy and decompression (March 2025) with significant improvement in right-side radiculopathy.  - Extensor handles stronger, foot numbness 50% better.  - Not considered truly disabled at this stage of recovery.  - Deferred disability assessment to primary care physician.    INTERVERTEBRAL DISC DISORDERS WITH RADICULOPATHY, LUMBOSACRAL REGION:  - Patient to continue physical therapy and advance activity levels gradually.               Note dictated with voice recognition software, please excuse any grammatical errors.This note was generated with the assistance of ambient listening technology. Verbal consent was obtained by the patient and accompanying visitor(s) for the recording of patient appointment to facilitate this note. I attest to having reviewed and edited the generated note for accuracy, though some syntax or spelling errors may persist. Please contact the author of this note for any clarification.              [1]   Current Outpatient Medications   Medication Sig Dispense Refill    acetaminophen (TYLENOL) 500 MG tablet Take 2 tablets (1,000 mg total) by mouth every 8  (eight) hours as needed for Pain. 60 tablet 0    amLODIPine-benazepriL (LOTREL) 5-40 mg per capsule Take 1 capsule by mouth once daily. 90 capsule 3    atorvastatin (LIPITOR) 10 MG tablet Take 1 tablet (10 mg total) by mouth every evening. 90 tablet 3    metFORMIN (GLUCOPHAGE-XR) 500 MG ER 24hr tablet Take 1 tablet (500 mg total) by mouth 2 (two) times daily. 180 tablet 3    methocarbamoL (ROBAXIN) 750 MG Tab Take 1 tablet (750 mg total) by mouth 4 (four) times daily as needed. 60 tablet 1    naproxen (EC NAPROSYN) 500 MG EC tablet Take 1 tablet (500 mg total) by mouth 2 (two) times daily with meals. 90 tablet 2    oxyCODONE (ROXICODONE) 5 MG immediate release tablet Take 1 tablet (5 mg total) by mouth every 4 (four) hours as needed for Pain. 30 tablet 0    polyethylene glycol (GLYCOLAX) 17 gram/dose powder Use to cap to measure 17g, mix with liquid, and take by mouth 2 (two) times daily as needed for Constipation. 510 g 0    gabapentin (NEURONTIN) 300 MG capsule Take 1 capsule (300 mg total) by mouth 3 (three) times daily. 90 capsule 0     No current facility-administered medications for this visit.

## 2025-04-23 PROBLEM — M53.86 DECREASED ROM OF LUMBAR SPINE: Status: ACTIVE | Noted: 2025-04-23

## 2025-04-23 PROBLEM — Z74.09 IMPAIRED FUNCTIONAL MOBILITY, BALANCE, AND ENDURANCE: Status: ACTIVE | Noted: 2025-04-23

## 2025-04-23 PROBLEM — M54.50 LUMBAR PAIN: Status: ACTIVE | Noted: 2025-04-23

## 2025-04-23 NOTE — PROGRESS NOTES
Outpatient Rehab    Physical Therapy Evaluation (only)    Patient Name: Ck Chavez  MRN: [id number]  YOB: 1965  Encounter Date: 4/15/2025    Therapy Diagnosis:   Encounter Diagnoses   Name Primary?    S/P discectomy     Lumbar pain Yes    Decreased ROM of lumbar spine     Impaired functional mobility, balance, and endurance      Physician: Irvin Sims MD    Physician Orders: Eval and Treat  Medical Diagnosis: S/P discectomy    Visit # / Visits Authorized:  1 / 1  Insurance Authorization Period: 4/7/2025 to 4/7/2026  Date of Evaluation: 4/15/2025  Plan of Care Certification: 4/15/2025 to 6/23/2025     Time In: 1309   Time Out: 1345  Total Time: 36   Total Billable Time: 36    Intake Outcome Measure for FOTO Survey    Therapist reviewed FOTO scores for Ck Chavez on 4/15/2025.   FOTO report - see Media section or FOTO account episode details.     Intake Score: 57%         Subjective   History of Present Illness  Ck is a 60 y.o. male who reports to physical therapy with a chief concern of limited ROM and pain.                 Dominant Hand: Right  History of Present Condition/Illness: Patient had discectomy on 3/17/2025. He reports he has some discomfort into his lower extremities with lumbar flexion. Pt drove himself today. Pt has some numbness and tingling to his big toe on right lower extremity. Denies any falls since the surgery. States he can perform all ADL's independently.     Activities of Daily Living  Social history was obtained from Patient.          Patient Responsibilities: Community mobility, Driving, Health management, Personal ADL    Previously independent with activities of daily living? Yes     Currently independent with activities of daily living? Yes          Previously independent with instrumental activities of daily living? Yes     Currently independent with instrumental activities of daily living? Yes              Pain     Patient reports a current pain level of 3/10. Pain at  best is reported as 0/10. Pain at worst is reported as 5/10.   Location: lumbar region and into legs with flexion  Clinical Progression (since onset): Improved  Pain Qualities: Other (Comment)  Other Pain Qualities: numbness  Pain-Relieving Factors: Medications - prescription  Pain-Aggravating Factors: Bending         Review of Systems  Patient reports: Diabetes  Patient denies: Bladder Incontinence and Bowel Incontinence        Treatment History  Treatments  Previously Received Treatments: No  Currently Receiving Treatments: No    Living Arrangements  Living Situation  Housing: Home independently  Living Arrangements: Spouse/significant other  Support Systems: Spouse/significant other    Home Setup  Type of Structure: House  Home Access: Level entry  Number of Levels in Home: One level  Patient is able to live on main floor of home: Yes  Bathroom Toilet: Standard        Employment  Patient reports: Does the patient's condition impact their ability to work?  Employment Status: Not employed          Past Medical History/Physical Systems Review:   Ck Chavez  has a past medical history of Diabetes mellitus and Hypertension.    Ck Chavez  has a past surgical history that includes Colonoscopy (N/A, 7/10/2024) and Lumbar discectomy (Right, 3/17/2025).    Ck has a current medication list which includes the following prescription(s): acetaminophen, amlodipine-benazepril, atorvastatin, gabapentin, metformin, methocarbamol, naproxen, oxycodone, and polyethylene glycol.    Review of patient's allergies indicates:  No Known Allergies     Objective   Posture  Patient presents with a Forward head position.     Shoulders are Rounded.             Lumbar Range of Motion   Active (deg) Passive (deg) Pain   Flexion 60   Yes   Extension 10       Right Lateral Flexion 20 (pain to hips)   Yes   Right Rotation         Left Lateral Flexion 20 (pain to hips)   Yes   Left Rotation                             Hip Strength - Planes of  Motion   Right Strength Right Pain Left Strength Left  Pain   Flexion (L2) 4 Yes 4 Yes   Extension           ABduction           ADduction           Internal Rotation           External Rotation               Knee Strength   Right Strength Right Pain Left Strength Left  Pain   Flexion (S2) 5   5     Prone Flexion           Extension (L3) 5   5            Ankle/Foot Strength - Planes of Motion   Right Strength Right Pain Left Strength Left  Pain   Dorsiflexion (L4) 3+   4-     Plantar Flexion (S1) 4   4+     Inversion           Eversion           Great Toe Flexion           Great Toe Extension (L5)           Lesser Toes Flexion           Lesser Toes Extension                  Bed Mobility Assessment  Rolling Assistance: Independent  Sidelying to Sit Assistance: Independent  Sit to Sidelying Assistance: Independent      Fall Risk  Functional mobility test results suggest the patient is: At Risk for Falls  Urban Balance  Urban Balance Scale  1. Sitting to Standing: Able to stand without using hands and stabilize independently  2. Standing Unsupported: Able to stand safely for 2 minutes  3. Sitting with Back Unsupported but Feet Supported on Floor or on a Stool: Able to sit safely and securely for 2 minutes  4. Standing to Sitting: Sits safely with minimal use of hands  5.  Transfers: Able to transfer safely with minor use of hands  6. Standing Unsupported with Eyes Closed: Able to stand 10 seconds with supervision  7. Standing Unsupported with Feet Together: Able to place feet together independently and stand 1 minute with supervision  8. Reach Forward with Outstretched Arm While Standing: Can reach forward 12 cm (5 inches)  9.  Object from Floor from a Standing Position: Able to  slipper but needs supervision  10. Turning to Look Behind Over Left and Right Shoulders While Standing: Looks behind one side only other side shows less weight shift  11. Turn 360 Degrees: Able to turn 360 degrees safely in 4  seconds or less  12. Place Alternate Foot on Step or Stool While Standing Unsupported: Able to stand independently and complete 8 steps in greater than 20 seconds  13. Standing Unsupported One Foot in Front: Loses balance while stepping or standing  14. Standing on One Leg: Unable to try needs assist to prevent fall  Urban Balance Score: 42  Urban Balance Fall Risk: Low    Interpretation:  41-56 = Low Fall Risk  21-40 = Medium Fall Risk  0-20 = High Fall Risk    Timed Up & Go (TUG)  Time: 12 seconds     An older adult who takes >=12 seconds to complete the TUG is at risk for falling.       Sit to Stand Testing  The patient completed 5 sit to stand transfers in 18 sec.               Ambulation Assistance Required  Surface With  Assistive Device Without Assistive Device Details   Level   Independent      Uneven   Independent     Curb           Gait Analysis  Base of Support: Normal    Right Foot Contact Pattern: Heel to toe    Left Foot Contact Pattern: Heel to toe        Time Entry(in minutes):  PT Evaluation (Low) Time Entry: 36    Assessment & Plan   Assessment  Ck presents with a condition of Low complexity.   Presentation of Symptoms: Stable  Will Comorbidities Impact Care: Yes  DM, HTN, right foot numbness    Functional Limitations: Ambulating on uneven surfaces, Decreased ambulation distance/endurance, Functional mobility, Increased risk of fall, Maintaining balance, Range of motion, Proprioception, Other (Comment)  Other Functional Limitations: bending, carrying and lifting  Impairments: Abnormal or restricted range of motion, Activity intolerance, Impaired balance, Impaired physical strength, Pain with functional activity, Lack of appropriate home exercise program  Personal Factors Affecting Prognosis: Cultural/Anabaptism considerations, Financial    Patient Goal for Therapy (PT): Improve lumbar flexion and strength  Prognosis: Good  Assessment Details: Ck is a 60 year ole male status post discectomy in  lumbar. History of right right radiculopathy that has reduced by 50% post surgery. Reports remaining 50% numbness in right foot. She reports limited lumbar range of motion, impaired strength, balance, and activity tolerance. Performed Timed Up and Go test in 12 seconds indicating impaired household mobility. Performed 5x sit to stands in 18 seconds without UE support indicating impaired functional strength. Pt scored 42/56 on Urban Balance Assessment indicating low fall risk. He is appropriate for physical therapy at this time to address deficits and return to prior level of function without limitations.    Plan  From a physical therapy perspective, the patient would benefit from: Skilled Rehab Services    Planned therapy interventions include: Therapeutic exercise, Therapeutic activities, Neuromuscular re-education, Manual therapy, and Gait training.    Planned modalities to include: Electrical stimulation - attended, Electrical stimulation - passive/unattended, Cryotherapy (cold pack), and Thermotherapy (hot pack).        Visit Frequency: 2 times Per Week for 6 Weeks.       This plan was discussed with Patient.   Discussion participants: Agreed Upon Plan of Care  Plan details: Plan of Care 4/15/2025 to 6/23/2025.          Patient's spiritual, cultural, and educational needs considered and patient agreeable to plan of care and goals.     Education  Education was done with Patient. The patient's learning style includes Listening. The patient Verbalizes understanding.         PT Plan of Care       Goals:   Active       Long Term [place]olas       Patient will increased lower extremity strength as evidenced by increase in manual muscle test  by 1/2 grade as appropriate to decrease gait deviations consistently.         Start:  04/15/25    Expected End:  06/23/25            Patient will demonstrate decreased fall risk by improving Urban Balance Assessment score from 42/56 to 47/56 to improve safety in household and during  community outings.         Start:  04/15/25    Expected End:  06/23/25            Patient will improve lumbar flexion range of motion to 70 degrees with minimal discomfort to improve activity tolerance.       Start:  04/15/25    Expected End:  06/23/25               Short Term Goals       Patient will report compliance with home exercise program 5 days a week to improve carry over.        Start:  04/15/25    Expected End:  06/23/25            Patient will improve Timed Up and Go testing from 12 seconds to 9.1 seconds to demonstrate improvement in household mobility and reduced fall risk. (MCID for stroke patient is 2.9 seconds)        Start:  04/15/25    Expected End:  06/23/25            Patient will improve 5x sit to stand time from 18 seconds to 15 seconds without UE support to demonstrate improvement in functional strength.       Start:  04/15/25    Expected End:  06/23/25                Michelle Hernandez, PT

## 2025-04-24 ENCOUNTER — CLINICAL SUPPORT (OUTPATIENT)
Dept: REHABILITATION | Facility: HOSPITAL | Age: 60
End: 2025-04-24
Payer: MEDICAID

## 2025-04-24 DIAGNOSIS — Z74.09 IMPAIRED FUNCTIONAL MOBILITY, BALANCE, AND ENDURANCE: ICD-10-CM

## 2025-04-24 DIAGNOSIS — M54.50 LUMBAR PAIN: Primary | ICD-10-CM

## 2025-04-24 DIAGNOSIS — M53.86 DECREASED ROM OF LUMBAR SPINE: ICD-10-CM

## 2025-04-24 PROCEDURE — 97112 NEUROMUSCULAR REEDUCATION: CPT | Mod: PO,CQ

## 2025-04-24 PROCEDURE — 97110 THERAPEUTIC EXERCISES: CPT | Mod: PO,CQ

## 2025-04-24 NOTE — PROGRESS NOTES
"  Outpatient Rehab    Physical Therapy Visit    Patient Name: Ck Chavez  MRN: [id number]  YOB: 1965  Encounter Date: 4/24/2025    Therapy Diagnosis:   Encounter Diagnoses   Name Primary?    Lumbar pain Yes    Decreased ROM of lumbar spine     Impaired functional mobility, balance, and endurance      Physician: Irvin Sims MD    Physician Orders: Eval and Treat  Medical Diagnosis: S/P discectomy    Visit # / Visits Authorized:  1 / 12  Insurance Authorization Period: 4/8/2025 to 6/23/2025  Date of Evaluation: 4/15/2025  Plan of Care Certification: 4/15/2025 to 6/23/2025      PT/PTA: PTA   Number of PTA visits since last PT visit:1    Time In: 0752      Time Out: 0839  Total Time: 47   Total Billable Time: 31    FOTO:  Intake Score:  %  Survey Score 1:  %  Survey Score 2:  %         Subjective   5/10 R sided LBP which radiates down.         Objective            Treatment:  Therapeutic Exercise  TE 1: prone lying x 2 minutes,   Prone prop on forearms x 1 minute-LE symptoms subsided),  TE 2: LTR x 20,  TE 3: Initiated home exercise program and reviewed with pt. Instruction to avoid any exercise or activity that increases pain.  Balance/Neuromuscular Re-Education  NMR 1: SG with left UE on wall x 2 x 10 (leg pain resolved but numbness remains),   EIS x 10-increased LE symptoms,     Prone glute squeeze x 10,   Prone hip extension x 10,   SLR x 10,    CHARGES BASED ON 1-1 TX:  Time Entry(in minutes):  Neuromuscular Re-Education Time Entry: 23  Therapeutic Exercise Time Entry: 24    Assessment & Plan   Assessment: Initial LBP with R LE pain and numbness with aforementioned responses to interventions, most improved with prone lying and propr of forearmns. Reported pain in LB and R LE "much better" with slight great toe numbness after session. Initiated HEP.  Evaluation/Treatment Tolerance: Patient tolerated treatment well    Patient will continue to benefit from skilled outpatient physical therapy to address " the deficits listed in the problem list box on initial evaluation, provide pt/family education and to maximize pt's level of independence in the home and community environment.     Patient's spiritual, cultural, and educational needs considered and patient agreeable to plan of care and goals.           Plan: Continue per POC, progressing as appropriate to decrease pain and improved functional mobility. Body mechanics training.    Goals:   Active       Long Term Golas       Patient will increased lower extremity strength as evidenced by increase in manual muscle test  by 1/2 grade as appropriate to decrease gait deviations consistently.   (Progressing)       Start:  04/15/25    Expected End:  06/23/25            Patient will demonstrate decreased fall risk by improving Urban Balance Assessment score from 42/56 to 47/56 to improve safety in household and during community outings.   (Progressing)       Start:  04/15/25    Expected End:  06/23/25            Patient will improve lumbar flexion range of motion to 70 degrees with minimal discomfort to improve activity tolerance. (Progressing)       Start:  04/15/25    Expected End:  06/23/25               Short Term Goals       Patient will report compliance with home exercise program 5 days a week to improve carry over.  (Progressing)       Start:  04/15/25    Expected End:  06/23/25            Patient will improve Timed Up and Go testing from 12 seconds to 9.1 seconds to demonstrate improvement in household mobility and reduced fall risk. (MCID for stroke patient is 2.9 seconds)  (Progressing)       Start:  04/15/25    Expected End:  06/23/25            Patient will improve 5x sit to stand time from 18 seconds to 15 seconds without UE support to demonstrate improvement in functional strength. (Progressing)       Start:  04/15/25    Expected End:  06/23/25                Anila Rocha PTA

## 2025-05-06 ENCOUNTER — TELEPHONE (OUTPATIENT)
Dept: REHABILITATION | Facility: HOSPITAL | Age: 60
End: 2025-05-06
Payer: MEDICAID

## 2025-05-08 ENCOUNTER — PATIENT OUTREACH (OUTPATIENT)
Dept: ADMINISTRATIVE | Facility: HOSPITAL | Age: 60
End: 2025-05-08
Payer: MEDICAID

## 2025-05-08 DIAGNOSIS — E11.9 TYPE 2 DIABETES MELLITUS WITHOUT COMPLICATION, UNSPECIFIED WHETHER LONG TERM INSULIN USE: Primary | ICD-10-CM

## 2025-05-08 NOTE — PROGRESS NOTES
Health Maintenance Due   Topic Date Due    Hepatitis C Screening  Never done    Diabetes Urine Screening  Never done    TETANUS VACCINE  Never done    Shingles Vaccine (1 of 2) Never done    RSV Vaccine (Age 60+ and Pregnant patients) (1 - Risk 60-74 years 1-dose series) Never done    Hemoglobin A1c  04/21/2024    Lipid Panel  10/21/2024    Diabetic Eye Exam  11/20/2024     Immunizations - reviewed and updated   Care Everywhere - triggered   Care Teams -   Outreach - A1C gap report reviewed. Patient due for diabetic labs ordered by PCP. Portal message sent in regards to scheduling   Lipid panel and Hemoglobin A1C done 10/21/2023, uploaded to media.  updated

## 2025-05-13 ENCOUNTER — CLINICAL SUPPORT (OUTPATIENT)
Dept: REHABILITATION | Facility: HOSPITAL | Age: 60
End: 2025-05-13
Payer: MEDICAID

## 2025-05-13 DIAGNOSIS — M54.50 LUMBAR PAIN: Primary | ICD-10-CM

## 2025-05-13 DIAGNOSIS — M53.86 DECREASED ROM OF LUMBAR SPINE: ICD-10-CM

## 2025-05-13 DIAGNOSIS — Z74.09 IMPAIRED FUNCTIONAL MOBILITY, BALANCE, AND ENDURANCE: ICD-10-CM

## 2025-05-13 PROCEDURE — 97110 THERAPEUTIC EXERCISES: CPT | Mod: PO,CQ

## 2025-05-13 NOTE — PROGRESS NOTES
Outpatient Rehab    Physical Therapy Visit    Patient Name: Ck Chavez  MRN: [id number]  YOB: 1965  Encounter Date: 5/13/2025    Therapy Diagnosis:   Encounter Diagnoses   Name Primary?    Lumbar pain Yes    Decreased ROM of lumbar spine     Impaired functional mobility, balance, and endurance        Physician: Irvin Sims MD    Physician Orders: Eval and Treat  Medical Diagnosis: S/P discectomy    Visit # / Visits Authorized:  2 / 12  Insurance Authorization Period: 4/8/2025 to 6/23/2025  Date of Evaluation: 4/15/2025  Plan of Care Certification: 4/15/2025 to 6/23/2025      PT/PTA:     Number of PTA visits since last PT visit:     Time In: 0905      Time Out: 0933  Total Time: 28   Total Billable Time: 14    FOTO:  Intake Score: 57%  Survey Score 1:  %  Survey Score 2:  %         Subjective   Went on a trip and just got back last night. Tired. Needs to leave early today..         Objective            Treatment:  Therapeutic Exercise  TE 1: prone lying x 2 minutes,   Press up  on forearms x 1 minute-LE symptoms subsided),  LTR x 20,  Initiated home exercise program and reviewed with pt. Instruction to avoid any exercise or activity that increases pain.  TE 2: SG with left UE on wall x 3 x 10 (leg pain resolved but numbness remains),   EIS x 10-increased LE symptoms,     Prone glute squeeze x 10,   Prone hip extension x 10,   SLR x 10      CHARGES BASED ON 1-1 TX:  Time Entry(in minutes):  Therapeutic Exercise Time Entry: 28    Assessment & Plan   Assessment: Initial pain improved after three trials of SG at wall and then prone exercises. leg pain resolved. LBP decreased to 2/10. R great toe numbness slight after session. Progressing well with strengthening and stabilization. HEP updated. Limited time 2* need to leave early.  Evaluation/Treatment Tolerance: Patient tolerated treatment well    Patient will continue to benefit from skilled outpatient physical therapy to address the deficits listed in  the problem list box on initial evaluation, provide pt/family education and to maximize pt's level of independence in the home and community environment.     Patient's spiritual, cultural, and educational needs considered and patient agreeable to plan of care and goals.           Plan: Continue per POC, progressing as appropriate to decrease pain and improved functional mobility. Body mechanics training.    Goals:   Active       Long Term Golas       Patient will increased lower extremity strength as evidenced by increase in manual muscle test  by 1/2 grade as appropriate to decrease gait deviations consistently.   (Progressing)       Start:  04/15/25    Expected End:  06/23/25            Patient will demonstrate decreased fall risk by improving Urban Balance Assessment score from 42/56 to 47/56 to improve safety in household and during community outings.   (Progressing)       Start:  04/15/25    Expected End:  06/23/25            Patient will improve lumbar flexion range of motion to 70 degrees with minimal discomfort to improve activity tolerance. (Progressing)       Start:  04/15/25    Expected End:  06/23/25               Short Term Goals       Patient will report compliance with home exercise program 5 days a week to improve carry over.  (Progressing)       Start:  04/15/25    Expected End:  06/23/25            Patient will improve Timed Up and Go testing from 12 seconds to 9.1 seconds to demonstrate improvement in household mobility and reduced fall risk. (MCID for stroke patient is 2.9 seconds)  (Progressing)       Start:  04/15/25    Expected End:  06/23/25            Patient will improve 5x sit to stand time from 18 seconds to 15 seconds without UE support to demonstrate improvement in functional strength. (Progressing)       Start:  04/15/25    Expected End:  06/23/25                  Anila Rocha PTA

## 2025-05-22 ENCOUNTER — CLINICAL SUPPORT (OUTPATIENT)
Dept: REHABILITATION | Facility: HOSPITAL | Age: 60
End: 2025-05-22
Payer: MEDICAID

## 2025-05-22 DIAGNOSIS — M54.50 LUMBAR PAIN: Primary | ICD-10-CM

## 2025-05-22 DIAGNOSIS — M53.86 DECREASED ROM OF LUMBAR SPINE: ICD-10-CM

## 2025-05-22 DIAGNOSIS — Z74.09 IMPAIRED FUNCTIONAL MOBILITY, BALANCE, AND ENDURANCE: ICD-10-CM

## 2025-05-22 DIAGNOSIS — Z98.890 S/P DISCECTOMY: ICD-10-CM

## 2025-05-22 PROCEDURE — 97110 THERAPEUTIC EXERCISES: CPT | Mod: PO

## 2025-05-22 NOTE — PROGRESS NOTES
Outpatient Rehab    Physical Therapy Visit    Patient Name: Ck Chavez  MRN: [id number]  YOB: 1965  Encounter Date: 5/22/2025    Therapy Diagnosis:   Encounter Diagnoses   Name Primary?    Lumbar pain Yes    S/P discectomy     Decreased ROM of lumbar spine     Impaired functional mobility, balance, and endurance      Physician: Irvin Sims MD    Physician Orders: Eval and Treat  Medical Diagnosis: S/P discectomy    Visit # / Visits Authorized:  3 / 12  Insurance Authorization Period: 4/8/2025 to 6/23/2025  Date of Evaluation: 4/15/2025  Plan of Care Certification: 4/23/2025 to 6/23/2025        Time In:   0900  Time Out:  1000  Total Time (in minutes):   60  Total Billable Time (in minutes):  55    Precautions:     standard      Subjective   Patient states his back pain and leg discomforts are a lot better. More concerned with his diabetes. Numbness is less since surgery includingr the right big toe which is always numbed..  Pain reported as 3/10.      Objective    SLR 80 deg bilat        Treatment:  Therapeutic Exercise  TE 1: Nustep L4 all 4's 10'  TE 2: standing gastroc stretches 2'  TE 3: heel raises 20  TE 4: mini squats 20  TE 5: sit to stand 10, half squat 2  TE 6: supine hamstring stretches  3'  TE 7: abdominal set/posterior pelvic tilt/glutes sets 20  TE 8: SLR emphasis on abdominal bracing 20/20  TE 9: prone lying 1'; on elbow 2'  TE 10: prone hip extension unilateral 10/10; alternate 10/10      Time Entry(in minutes):  Therapeutic Exercise Time Entry: 55    Assessment & Plan   Assessment: Patient wants more resistance during initial upper/lower body exercises on Nustep but kept at level 4  Evaluation/Treatment Tolerance: Patient tolerated treatment well    The patient will continue to benefit from skilled outpatient physical therapy in order to address the deficits listed in the problem list on the initial evaluation, provide patient and family education, and maximize the patients level  of independence in the home and community environments.     The patient's spiritual, cultural, and educational needs were considered, and the patient is agreeable to the plan of care and goals.           Plan: Continue strengthening and functional mobility    Goals:   Active       Long Term Golas       Patient will increased lower extremity strength as evidenced by increase in manual muscle test  by 1/2 grade as appropriate to decrease gait deviations consistently.   (Ongoing)       Start:  04/15/25    Expected End:  06/23/25            Patient will demonstrate decreased fall risk by improving Urban Balance Assessment score from 42/56 to 47/56 to improve safety in household and during community outings.   (Ongoing)       Start:  04/15/25    Expected End:  06/23/25            Patient will improve lumbar flexion range of motion to 70 degrees with minimal discomfort to improve activity tolerance. (Ongoing)       Start:  04/15/25    Expected End:  06/23/25               Short Term Goals       Patient will report compliance with home exercise program 5 days a week to improve carry over.  (Ongoing)       Start:  04/15/25    Expected End:  06/23/25            Patient will improve Timed Up and Go testing from 12 seconds to 9.1 seconds to demonstrate improvement in household mobility and reduced fall risk. (MCID for stroke patient is 2.9 seconds)  (Ongoing)       Start:  04/15/25    Expected End:  06/23/25            Patient will improve 5x sit to stand time from 18 seconds to 15 seconds without UE support to demonstrate improvement in functional strength. (Ongoing)       Start:  04/15/25    Expected End:  06/23/25                Pito ePrez, PT

## 2025-05-27 ENCOUNTER — CLINICAL SUPPORT (OUTPATIENT)
Dept: REHABILITATION | Facility: HOSPITAL | Age: 60
End: 2025-05-27
Payer: MEDICAID

## 2025-05-27 DIAGNOSIS — M53.86 DECREASED ROM OF LUMBAR SPINE: ICD-10-CM

## 2025-05-27 DIAGNOSIS — M54.50 LUMBAR PAIN: Primary | ICD-10-CM

## 2025-05-27 DIAGNOSIS — Z74.09 IMPAIRED FUNCTIONAL MOBILITY, BALANCE, AND ENDURANCE: ICD-10-CM

## 2025-05-27 PROCEDURE — 97110 THERAPEUTIC EXERCISES: CPT | Mod: PO

## 2025-05-27 NOTE — PROGRESS NOTES
Outpatient Rehab    Physical Therapy Visit    Patient Name: Ck Chavez  MRN: [id number]  YOB: 1965  Encounter Date: 5/27/2025    Therapy Diagnosis:   Encounter Diagnoses   Name Primary?    Lumbar pain Yes    Decreased ROM of lumbar spine     Impaired functional mobility, balance, and endurance      Physician: Irvin Sims MD    Physician Orders: Eval and Treat  Medical Diagnosis: S/P discectomy    Visit # / Visits Authorized:  4 / 12  Insurance Authorization Period: 4/8/2025 to 6/23/2025  Date of Evaluation: 4/15/2025  Plan of Care Certification: 4/23/2025 to 6/23/2025      Time In:   1100  Time Out:  1138  Total Time (in minutes):   38  Total Billable Time (in minutes):  38    Precautions:     standard      Subjective   Patient requests  to leave early  for MD appointment..  Pain reported as 2/10. low back    Objective    SLR 85 deg bilat        Treatment:  Therapeutic Exercise  TE 1: Nustep L4 all 4's 5'  TE 2: standing gastroc stretches 2'  TE 3: heel raises 20  TE 4: mini squats 20  TE 5: supine hamstring/piriorms stretches 3'  TE 6: posterior pelvic tilt 20  TE 7: Hooklying hip abduction/addcution manaula resist 20/20  TE 8: SLR emphasis on abdominal bracing 20/20      Time Entry(in minutes):  Therapeutic Exercise Time Entry: 38    Assessment & Plan   Assessment: Shortened session as patient had another appointment to keep.  Evaluation/Treatment Tolerance: Patient tolerated treatment well    The patient will continue to benefit from skilled outpatient physical therapy in order to address the deficits listed in the problem list on the initial evaluation, provide patient and family education, and maximize the patients level of independence in the home and community environments.     The patient's spiritual, cultural, and educational needs were considered, and the patient is agreeable to the plan of care and goals.           Plan: Continue PLan of care.    Goals:   Active       Long Term Golas        Patient will increased lower extremity strength as evidenced by increase in manual muscle test  by 1/2 grade as appropriate to decrease gait deviations consistently.   (Ongoing)       Start:  04/15/25    Expected End:  06/23/25            Patient will demonstrate decreased fall risk by improving Urban Balance Assessment score from 42/56 to 47/56 to improve safety in household and during community outings.   (Ongoing)       Start:  04/15/25    Expected End:  06/23/25            Patient will improve lumbar flexion range of motion to 70 degrees with minimal discomfort to improve activity tolerance. (Ongoing)       Start:  04/15/25    Expected End:  06/23/25               Short Term Goals       Patient will report compliance with home exercise program 5 days a week to improve carry over.  (Ongoing)       Start:  04/15/25    Expected End:  06/23/25            Patient will improve Timed Up and Go testing from 12 seconds to 9.1 seconds to demonstrate improvement in household mobility and reduced fall risk. (MCID for stroke patient is 2.9 seconds)  (Ongoing)       Start:  04/15/25    Expected End:  06/23/25            Patient will improve 5x sit to stand time from 18 seconds to 15 seconds without UE support to demonstrate improvement in functional strength. (Ongoing)       Start:  04/15/25    Expected End:  06/23/25                Pito Perez, PT

## 2025-06-03 ENCOUNTER — CLINICAL SUPPORT (OUTPATIENT)
Dept: REHABILITATION | Facility: HOSPITAL | Age: 60
End: 2025-06-03
Payer: MEDICAID

## 2025-06-03 DIAGNOSIS — Z74.09 IMPAIRED FUNCTIONAL MOBILITY, BALANCE, AND ENDURANCE: ICD-10-CM

## 2025-06-03 DIAGNOSIS — M54.50 LUMBAR PAIN: Primary | ICD-10-CM

## 2025-06-03 DIAGNOSIS — M53.86 DECREASED ROM OF LUMBAR SPINE: ICD-10-CM

## 2025-06-03 PROCEDURE — 97110 THERAPEUTIC EXERCISES: CPT | Mod: PO,CQ

## 2025-06-05 ENCOUNTER — CLINICAL SUPPORT (OUTPATIENT)
Dept: REHABILITATION | Facility: HOSPITAL | Age: 60
End: 2025-06-05
Payer: MEDICAID

## 2025-06-05 DIAGNOSIS — M53.86 DECREASED ROM OF LUMBAR SPINE: ICD-10-CM

## 2025-06-05 DIAGNOSIS — M54.50 LUMBAR PAIN: Primary | ICD-10-CM

## 2025-06-05 DIAGNOSIS — Z74.09 IMPAIRED FUNCTIONAL MOBILITY, BALANCE, AND ENDURANCE: ICD-10-CM

## 2025-06-05 PROCEDURE — 97110 THERAPEUTIC EXERCISES: CPT | Mod: PO

## 2025-07-22 ENCOUNTER — PATIENT MESSAGE (OUTPATIENT)
Dept: ADMINISTRATIVE | Facility: HOSPITAL | Age: 60
End: 2025-07-22
Payer: MEDICAID

## (undated) DEVICE — CARTRIDGE OIL

## (undated) DEVICE — TUBE FRAZIER 5MM 2FT SOFT TIP

## (undated) DEVICE — ELECTRODE REM PLYHSV RETURN 9

## (undated) DEVICE — CORD BIPOLAR 12 FOOT

## (undated) DEVICE — DRAPE STERI INSTRUMENT 1018

## (undated) DEVICE — DRAPE OPMI STERILE

## (undated) DEVICE — DRAPE ABDOMINAL TIBURON 14X11

## (undated) DEVICE — TRAY NEURO OMC

## (undated) DEVICE — KIT SURGIFLO HEMOSTATIC MATRIX

## (undated) DEVICE — SUT VICRYL+ 27 UR-6 VIOL

## (undated) DEVICE — DURAPREP SURG SCRUB 26ML

## (undated) DEVICE — TRAY CATH 1-LYR URIMTR 16FR

## (undated) DEVICE — DIFFUSER

## (undated) DEVICE — SYR 10CC LUER LOCK

## (undated) DEVICE — DRAPE INCISE IOBAN 2 23X17IN

## (undated) DEVICE — MARKER SKIN RULER STERILE

## (undated) DEVICE — DRESSING MEPILEX BORDER 4 X 4

## (undated) DEVICE — SUT D SPECIAL VICRYL 2-0

## (undated) DEVICE — DRAPE C-ARM ELAS CLIP 42X120IN

## (undated) DEVICE — ELECTRODE MEGADYNE RETURN DUAL

## (undated) DEVICE — DRESSING MEPILEX SACR 22X25CM

## (undated) DEVICE — SUT MCRYL PLUS 4-0 PS2 27IN

## (undated) DEVICE — NDL SPINAL 18GX3.5 SPINOCAN

## (undated) DEVICE — KIT SPINAL PATIENT CARE JACK

## (undated) DEVICE — SUT VICRYL PLUS 3-0 SH 18IN

## (undated) DEVICE — SPONGE COTTON TRAY 4X4IN

## (undated) DEVICE — DRESSING AQUACEL FOAM 5 X 5

## (undated) DEVICE — BLADE ELECTRO EDGE INSULATED

## (undated) DEVICE — SYR SLIP TIP 1CC

## (undated) DEVICE — PENCIL ROCKER SWITCH 10FT CORD

## (undated) DEVICE — DRESSING SURGICAL 1/2X1/2

## (undated) DEVICE — DRAPE STERI-DRAPE 1000 17X11IN

## (undated) DEVICE — DRAPE C-ARMOR EQUIPMENT COVER

## (undated) DEVICE — BUR BONE CUT MICRO TPS 3X3.8MM

## (undated) DEVICE — CLIPPER BLADE MOD 4406 (CAREF)

## (undated) DEVICE — DRAPE TOP 53X102IN